# Patient Record
Sex: FEMALE | Race: OTHER
[De-identification: names, ages, dates, MRNs, and addresses within clinical notes are randomized per-mention and may not be internally consistent; named-entity substitution may affect disease eponyms.]

---

## 2017-06-19 ENCOUNTER — HOSPITAL ENCOUNTER (INPATIENT)
Dept: HOSPITAL 54 - ER | Age: 82
LOS: 2 days | Discharge: HOME | DRG: 201 | End: 2017-06-21
Attending: NURSE PRACTITIONER | Admitting: NURSE PRACTITIONER
Payer: MEDICAID

## 2017-06-19 VITALS — DIASTOLIC BLOOD PRESSURE: 66 MMHG | SYSTOLIC BLOOD PRESSURE: 129 MMHG

## 2017-06-19 VITALS — SYSTOLIC BLOOD PRESSURE: 145 MMHG | DIASTOLIC BLOOD PRESSURE: 65 MMHG

## 2017-06-19 VITALS — DIASTOLIC BLOOD PRESSURE: 53 MMHG | SYSTOLIC BLOOD PRESSURE: 105 MMHG

## 2017-06-19 VITALS — HEIGHT: 64 IN | WEIGHT: 159 LBS | BODY MASS INDEX: 27.14 KG/M2

## 2017-06-19 DIAGNOSIS — E11.9: ICD-10-CM

## 2017-06-19 DIAGNOSIS — K21.9: ICD-10-CM

## 2017-06-19 DIAGNOSIS — E83.42: ICD-10-CM

## 2017-06-19 DIAGNOSIS — Z90.49: ICD-10-CM

## 2017-06-19 DIAGNOSIS — E78.5: ICD-10-CM

## 2017-06-19 DIAGNOSIS — N17.0: ICD-10-CM

## 2017-06-19 DIAGNOSIS — E03.9: ICD-10-CM

## 2017-06-19 DIAGNOSIS — Z96.659: ICD-10-CM

## 2017-06-19 DIAGNOSIS — Z79.01: ICD-10-CM

## 2017-06-19 DIAGNOSIS — I21.4: ICD-10-CM

## 2017-06-19 DIAGNOSIS — D68.59: ICD-10-CM

## 2017-06-19 DIAGNOSIS — R07.9: ICD-10-CM

## 2017-06-19 DIAGNOSIS — I10: ICD-10-CM

## 2017-06-19 DIAGNOSIS — I48.0: Primary | ICD-10-CM

## 2017-06-19 LAB
ALBUMIN SERPL BCP-MCNC: 3.3 G/DL (ref 3.4–5)
ALP SERPL-CCNC: 96 U/L (ref 46–116)
ALT SERPL W P-5'-P-CCNC: 18 U/L (ref 12–78)
APTT PPP: 28 SEC (ref 23–34)
AST SERPL W P-5'-P-CCNC: 22 U/L (ref 15–37)
BASOPHILS # BLD AUTO: 0 /CMM (ref 0–0.2)
BASOPHILS NFR BLD AUTO: 0.7 % (ref 0–2)
BILIRUB DIRECT SERPL-MCNC: 0.1 MG/DL (ref 0–0.2)
BILIRUB SERPL-MCNC: 0.4 MG/DL (ref 0.2–1)
BUN SERPL-MCNC: 34 MG/DL (ref 7–18)
CALCIUM SERPL-MCNC: 9.1 MG/DL (ref 8.5–10.1)
CHLORIDE SERPL-SCNC: 103 MMOL/L (ref 98–107)
CO2 SERPL-SCNC: 26 MMOL/L (ref 21–32)
CREAT SERPL-MCNC: 1.5 MG/DL (ref 0.6–1.3)
EOSINOPHIL # BLD AUTO: 0.1 /CMM (ref 0–0.7)
EOSINOPHIL NFR BLD AUTO: 1.7 % (ref 0–6)
GLUCOSE SERPL-MCNC: 102 MG/DL (ref 74–106)
HCT VFR BLD AUTO: 33 % (ref 33–45)
HGB BLD-MCNC: 11.2 G/DL (ref 11.5–14.8)
INR PPP: 1 (ref 0.87–1.13)
LYMPHOCYTES NFR BLD AUTO: 2.2 /CMM (ref 0.8–4.8)
LYMPHOCYTES NFR BLD AUTO: 31.4 % (ref 20–44)
MCH RBC QN AUTO: 28 PG (ref 26–33)
MCHC RBC AUTO-ENTMCNC: 34 G/DL (ref 31–36)
MCV RBC AUTO: 83 FL (ref 82–100)
MONOCYTES NFR BLD AUTO: 0.5 /CMM (ref 0.1–1.3)
MONOCYTES NFR BLD AUTO: 6.8 % (ref 2–12)
NEUTROPHILS # BLD AUTO: 4.1 /CMM (ref 1.8–8.9)
NEUTROPHILS NFR BLD AUTO: 59.4 % (ref 43–81)
PLATELET # BLD AUTO: 266 /CMM (ref 150–450)
POTASSIUM SERPL-SCNC: 3.9 MMOL/L (ref 3.5–5.1)
PROT SERPL-MCNC: 7 G/DL (ref 6.4–8.2)
PROTHROMBIN TIME: 10.7 SECS (ref 9.5–12.7)
RBC # BLD AUTO: 3.98 MIL/UL (ref 4–5.2)
RDW COEFFICIENT OF VARIATION: 15.2 (ref 11.5–15)
SODIUM SERPL-SCNC: 141 MMOL/L (ref 136–145)
TROPONIN I SERPL-MCNC: 0.02 NG/ML (ref 0–0.06)
WBC NRBC COR # BLD AUTO: 6.9 K/UL (ref 4.3–11)

## 2017-06-19 PROCEDURE — Z7610: HCPCS

## 2017-06-19 PROCEDURE — A6407 PACKING STRIPS, NON-IMPREG: HCPCS

## 2017-06-19 PROCEDURE — A4606 OXYGEN PROBE USED W OXIMETER: HCPCS

## 2017-06-19 RX ADMIN — METOPROLOL TARTRATE SCH MG: 25 TABLET, FILM COATED ORAL at 17:29

## 2017-06-19 RX ADMIN — FUROSEMIDE SCH MG: 40 TABLET ORAL at 17:28

## 2017-06-19 RX ADMIN — AMIODARONE HYDROCHLORIDE SCH MG: 200 TABLET ORAL at 17:29

## 2017-06-19 RX ADMIN — SIMVASTATIN SCH MG: 10 TABLET, FILM COATED ORAL at 22:15

## 2017-06-19 RX ADMIN — SODIUM CHLORIDE PRN MLS/HR: 9 INJECTION, SOLUTION INTRAVENOUS at 17:28

## 2017-06-19 RX ADMIN — RIVAROXABAN SCH MG: 15 TABLET, FILM COATED ORAL at 17:30

## 2017-06-20 VITALS — DIASTOLIC BLOOD PRESSURE: 63 MMHG | SYSTOLIC BLOOD PRESSURE: 149 MMHG

## 2017-06-20 VITALS — DIASTOLIC BLOOD PRESSURE: 68 MMHG | SYSTOLIC BLOOD PRESSURE: 140 MMHG

## 2017-06-20 VITALS — SYSTOLIC BLOOD PRESSURE: 156 MMHG | DIASTOLIC BLOOD PRESSURE: 65 MMHG

## 2017-06-20 VITALS — DIASTOLIC BLOOD PRESSURE: 63 MMHG | SYSTOLIC BLOOD PRESSURE: 154 MMHG

## 2017-06-20 VITALS — SYSTOLIC BLOOD PRESSURE: 149 MMHG | DIASTOLIC BLOOD PRESSURE: 63 MMHG

## 2017-06-20 VITALS — SYSTOLIC BLOOD PRESSURE: 148 MMHG | DIASTOLIC BLOOD PRESSURE: 61 MMHG

## 2017-06-20 VITALS — DIASTOLIC BLOOD PRESSURE: 62 MMHG | SYSTOLIC BLOOD PRESSURE: 120 MMHG

## 2017-06-20 LAB
ALBUMIN SERPL BCP-MCNC: 2.9 G/DL (ref 3.4–5)
ALP SERPL-CCNC: 81 U/L (ref 46–116)
ALT SERPL W P-5'-P-CCNC: 17 U/L (ref 12–78)
AST SERPL W P-5'-P-CCNC: 22 U/L (ref 15–37)
BASOPHILS # BLD AUTO: 0 /CMM (ref 0–0.2)
BASOPHILS NFR BLD AUTO: 0.4 % (ref 0–2)
BILIRUB SERPL-MCNC: 0.4 MG/DL (ref 0.2–1)
BUN SERPL-MCNC: 27 MG/DL (ref 7–18)
CALCIUM SERPL-MCNC: 8.7 MG/DL (ref 8.5–10.1)
CHLORIDE SERPL-SCNC: 107 MMOL/L (ref 98–107)
CHOLEST SERPL-MCNC: 128 MG/DL (ref ?–200)
CO2 SERPL-SCNC: 31 MMOL/L (ref 21–32)
CREAT SERPL-MCNC: 1.3 MG/DL (ref 0.6–1.3)
EOSINOPHIL # BLD AUTO: 0.2 /CMM (ref 0–0.7)
EOSINOPHIL NFR BLD AUTO: 2.7 % (ref 0–6)
FERRITIN SERPL-MCNC: 31 NG/ML (ref 8–388)
GLUCOSE SERPL-MCNC: 84 MG/DL (ref 74–106)
HCT VFR BLD AUTO: 33 % (ref 33–45)
HDLC SERPL-MCNC: 33 MG/DL (ref 40–60)
HGB BLD-MCNC: 10.9 G/DL (ref 11.5–14.8)
IRON SERPL-MCNC: 68 UG/DL (ref 50–175)
LDLC SERPL DIRECT ASSAY-MCNC: 65 MG/DL (ref 0–99)
LYMPHOCYTES NFR BLD AUTO: 1.9 /CMM (ref 0.8–4.8)
LYMPHOCYTES NFR BLD AUTO: 29.1 % (ref 20–44)
MAGNESIUM SERPL-MCNC: 1.7 MG/DL (ref 1.8–2.4)
MCH RBC QN AUTO: 28 PG (ref 26–33)
MCHC RBC AUTO-ENTMCNC: 33 G/DL (ref 31–36)
MCV RBC AUTO: 85 FL (ref 82–100)
MONOCYTES NFR BLD AUTO: 0.4 /CMM (ref 0.1–1.3)
MONOCYTES NFR BLD AUTO: 6.5 % (ref 2–12)
NEUTROPHILS # BLD AUTO: 4.1 /CMM (ref 1.8–8.9)
NEUTROPHILS NFR BLD AUTO: 61.3 % (ref 43–81)
PHOSPHATE SERPL-MCNC: 4 MG/DL (ref 2.5–4.9)
PLATELET # BLD AUTO: 244 /CMM (ref 150–450)
POTASSIUM SERPL-SCNC: 4.3 MMOL/L (ref 3.5–5.1)
PROT SERPL-MCNC: 6.5 G/DL (ref 6.4–8.2)
RBC # BLD AUTO: 3.89 MIL/UL (ref 4–5.2)
RDW COEFFICIENT OF VARIATION: 16.4 (ref 11.5–15)
SODIUM SERPL-SCNC: 142 MMOL/L (ref 136–145)
TIBC SERPL-MCNC: 282 UG/DL (ref 250–450)
TRIGL SERPL-MCNC: 138 MG/DL (ref 30–150)
TSH SERPL DL<=0.005 MIU/L-ACNC: 6.12 UIU/ML (ref 0.36–3.74)
WBC NRBC COR # BLD AUTO: 6.7 K/UL (ref 4.3–11)

## 2017-06-20 RX ADMIN — FUROSEMIDE SCH MG: 40 TABLET ORAL at 18:23

## 2017-06-20 RX ADMIN — FUROSEMIDE SCH MG: 40 TABLET ORAL at 08:13

## 2017-06-20 RX ADMIN — SODIUM CHLORIDE PRN MLS/HR: 9 INJECTION, SOLUTION INTRAVENOUS at 08:15

## 2017-06-20 RX ADMIN — LISINOPRIL SCH MG: 20 TABLET ORAL at 08:13

## 2017-06-20 RX ADMIN — SIMVASTATIN SCH MG: 10 TABLET, FILM COATED ORAL at 21:16

## 2017-06-20 RX ADMIN — METOPROLOL TARTRATE SCH MG: 25 TABLET, FILM COATED ORAL at 08:14

## 2017-06-20 RX ADMIN — AMIODARONE HYDROCHLORIDE SCH MG: 200 TABLET ORAL at 17:00

## 2017-06-20 RX ADMIN — MAGNESIUM SULFATE IN DEXTROSE SCH MLS/HR: 10 INJECTION, SOLUTION INTRAVENOUS at 12:52

## 2017-06-20 RX ADMIN — MAGNESIUM SULFATE IN DEXTROSE SCH MLS/HR: 10 INJECTION, SOLUTION INTRAVENOUS at 14:08

## 2017-06-20 RX ADMIN — RIVAROXABAN SCH MG: 15 TABLET, FILM COATED ORAL at 18:27

## 2017-06-20 RX ADMIN — AMIODARONE HYDROCHLORIDE SCH MG: 200 TABLET ORAL at 12:53

## 2017-06-20 RX ADMIN — METOPROLOL TARTRATE SCH MG: 25 TABLET, FILM COATED ORAL at 17:00

## 2017-06-20 RX ADMIN — AMIODARONE HYDROCHLORIDE SCH MG: 200 TABLET ORAL at 08:14

## 2017-06-21 VITALS — DIASTOLIC BLOOD PRESSURE: 72 MMHG | SYSTOLIC BLOOD PRESSURE: 137 MMHG

## 2017-06-21 VITALS — SYSTOLIC BLOOD PRESSURE: 140 MMHG | DIASTOLIC BLOOD PRESSURE: 72 MMHG

## 2017-06-21 VITALS — DIASTOLIC BLOOD PRESSURE: 66 MMHG | SYSTOLIC BLOOD PRESSURE: 137 MMHG

## 2017-06-21 VITALS — DIASTOLIC BLOOD PRESSURE: 70 MMHG | SYSTOLIC BLOOD PRESSURE: 155 MMHG

## 2017-06-21 VITALS — DIASTOLIC BLOOD PRESSURE: 72 MMHG | SYSTOLIC BLOOD PRESSURE: 140 MMHG

## 2017-06-21 LAB
ALBUMIN SERPL BCP-MCNC: 2.8 G/DL (ref 3.4–5)
ALP SERPL-CCNC: 87 U/L (ref 46–116)
ALT SERPL W P-5'-P-CCNC: 19 U/L (ref 12–78)
AST SERPL W P-5'-P-CCNC: 27 U/L (ref 15–37)
BASOPHILS # BLD AUTO: 0 /CMM (ref 0–0.2)
BASOPHILS NFR BLD AUTO: 0.4 % (ref 0–2)
BILIRUB SERPL-MCNC: 0.5 MG/DL (ref 0.2–1)
BUN SERPL-MCNC: 24 MG/DL (ref 7–18)
CALCIUM SERPL-MCNC: 8.5 MG/DL (ref 8.5–10.1)
CHLORIDE SERPL-SCNC: 106 MMOL/L (ref 98–107)
CO2 SERPL-SCNC: 29 MMOL/L (ref 21–32)
CREAT SERPL-MCNC: 1.3 MG/DL (ref 0.6–1.3)
EOSINOPHIL # BLD AUTO: 0.2 /CMM (ref 0–0.7)
EOSINOPHIL NFR BLD AUTO: 3.1 % (ref 0–6)
GLUCOSE SERPL-MCNC: 93 MG/DL (ref 74–106)
HCT VFR BLD AUTO: 33 % (ref 33–45)
HGB BLD-MCNC: 11 G/DL (ref 11.5–14.8)
LYMPHOCYTES NFR BLD AUTO: 1.7 /CMM (ref 0.8–4.8)
LYMPHOCYTES NFR BLD AUTO: 27.6 % (ref 20–44)
MAGNESIUM SERPL-MCNC: 1.8 MG/DL (ref 1.8–2.4)
MCH RBC QN AUTO: 28 PG (ref 26–33)
MCHC RBC AUTO-ENTMCNC: 33 G/DL (ref 31–36)
MCV RBC AUTO: 85 FL (ref 82–100)
MONOCYTES NFR BLD AUTO: 0.4 /CMM (ref 0.1–1.3)
MONOCYTES NFR BLD AUTO: 6.6 % (ref 2–12)
NEUTROPHILS # BLD AUTO: 3.8 /CMM (ref 1.8–8.9)
NEUTROPHILS NFR BLD AUTO: 62.3 % (ref 43–81)
PHOSPHATE SERPL-MCNC: 3.7 MG/DL (ref 2.5–4.9)
PLATELET # BLD AUTO: 237 /CMM (ref 150–450)
POTASSIUM SERPL-SCNC: 4 MMOL/L (ref 3.5–5.1)
PROT SERPL-MCNC: 6.5 G/DL (ref 6.4–8.2)
RBC # BLD AUTO: 3.91 MIL/UL (ref 4–5.2)
RDW COEFFICIENT OF VARIATION: 16 (ref 11.5–15)
SODIUM SERPL-SCNC: 142 MMOL/L (ref 136–145)
TROPONIN I SERPL-MCNC: 0.03 NG/ML (ref 0–0.06)
WBC NRBC COR # BLD AUTO: 6.2 K/UL (ref 4.3–11)

## 2017-06-21 RX ADMIN — AMIODARONE HYDROCHLORIDE SCH MG: 200 TABLET ORAL at 12:24

## 2017-06-21 RX ADMIN — LISINOPRIL SCH MG: 20 TABLET ORAL at 08:48

## 2017-06-21 RX ADMIN — SODIUM CHLORIDE PRN MLS/HR: 9 INJECTION, SOLUTION INTRAVENOUS at 01:45

## 2017-06-21 RX ADMIN — FUROSEMIDE SCH MG: 40 TABLET ORAL at 08:46

## 2017-06-21 RX ADMIN — AMIODARONE HYDROCHLORIDE SCH MG: 200 TABLET ORAL at 08:49

## 2017-06-21 RX ADMIN — METOPROLOL TARTRATE SCH MG: 25 TABLET, FILM COATED ORAL at 08:48

## 2018-06-22 ENCOUNTER — HOSPITAL ENCOUNTER (EMERGENCY)
Dept: HOSPITAL 54 - ER | Age: 83
LOS: 1 days | Discharge: HOME | End: 2018-06-23
Payer: COMMERCIAL

## 2018-06-22 VITALS — HEIGHT: 65 IN | WEIGHT: 145 LBS | BODY MASS INDEX: 24.16 KG/M2

## 2018-06-22 DIAGNOSIS — E78.00: ICD-10-CM

## 2018-06-22 DIAGNOSIS — E05.90: ICD-10-CM

## 2018-06-22 DIAGNOSIS — I11.0: ICD-10-CM

## 2018-06-22 DIAGNOSIS — Z98.890: ICD-10-CM

## 2018-06-22 DIAGNOSIS — I50.9: ICD-10-CM

## 2018-06-22 DIAGNOSIS — M25.562: Primary | ICD-10-CM

## 2018-06-22 DIAGNOSIS — M17.9: ICD-10-CM

## 2018-06-22 DIAGNOSIS — I48.91: ICD-10-CM

## 2018-06-22 PROCEDURE — A4606 OXYGEN PROBE USED W OXIMETER: HCPCS

## 2018-06-22 PROCEDURE — Z7610: HCPCS

## 2018-06-23 VITALS — DIASTOLIC BLOOD PRESSURE: 84 MMHG | SYSTOLIC BLOOD PRESSURE: 159 MMHG

## 2021-02-21 ENCOUNTER — HOSPITAL ENCOUNTER (INPATIENT)
Dept: HOSPITAL 54 - ER | Age: 86
LOS: 3 days | Discharge: HOME | DRG: 253 | End: 2021-02-24
Attending: NURSE PRACTITIONER | Admitting: INTERNAL MEDICINE
Payer: MEDICAID

## 2021-02-21 VITALS — WEIGHT: 144 LBS | HEIGHT: 65 IN | BODY MASS INDEX: 23.99 KG/M2

## 2021-02-21 VITALS — SYSTOLIC BLOOD PRESSURE: 131 MMHG | DIASTOLIC BLOOD PRESSURE: 73 MMHG

## 2021-02-21 DIAGNOSIS — E86.1: ICD-10-CM

## 2021-02-21 DIAGNOSIS — M41.9: ICD-10-CM

## 2021-02-21 DIAGNOSIS — Z79.899: ICD-10-CM

## 2021-02-21 DIAGNOSIS — Z20.822: ICD-10-CM

## 2021-02-21 DIAGNOSIS — I48.91: ICD-10-CM

## 2021-02-21 DIAGNOSIS — K57.90: ICD-10-CM

## 2021-02-21 DIAGNOSIS — J98.11: ICD-10-CM

## 2021-02-21 DIAGNOSIS — N17.0: ICD-10-CM

## 2021-02-21 DIAGNOSIS — E03.9: ICD-10-CM

## 2021-02-21 DIAGNOSIS — I10: ICD-10-CM

## 2021-02-21 DIAGNOSIS — N13.9: ICD-10-CM

## 2021-02-21 DIAGNOSIS — Z79.01: ICD-10-CM

## 2021-02-21 DIAGNOSIS — I70.0: ICD-10-CM

## 2021-02-21 DIAGNOSIS — Z90.710: ICD-10-CM

## 2021-02-21 DIAGNOSIS — K92.2: Primary | ICD-10-CM

## 2021-02-21 DIAGNOSIS — E78.5: ICD-10-CM

## 2021-02-21 LAB
ALBUMIN SERPL BCP-MCNC: 3.4 G/DL (ref 3.4–5)
ALP SERPL-CCNC: 135 U/L (ref 46–116)
ALT SERPL W P-5'-P-CCNC: 24 U/L (ref 12–78)
AST SERPL W P-5'-P-CCNC: 41 U/L (ref 15–37)
BASOPHILS # BLD AUTO: 0.1 /CMM (ref 0–0.2)
BASOPHILS NFR BLD AUTO: 1.2 % (ref 0–2)
BILIRUB DIRECT SERPL-MCNC: 0.1 MG/DL (ref 0–0.2)
BILIRUB SERPL-MCNC: 0.3 MG/DL (ref 0.2–1)
BUN SERPL-MCNC: 21 MG/DL (ref 7–18)
CALCIUM SERPL-MCNC: 8.9 MG/DL (ref 8.5–10.1)
CHLORIDE SERPL-SCNC: 103 MMOL/L (ref 98–107)
CO2 SERPL-SCNC: 24 MMOL/L (ref 21–32)
CREAT SERPL-MCNC: 1.3 MG/DL (ref 0.6–1.3)
EOSINOPHIL NFR BLD AUTO: 1.3 % (ref 0–6)
GLUCOSE SERPL-MCNC: 165 MG/DL (ref 74–106)
HCT VFR BLD AUTO: 43 % (ref 33–45)
HGB BLD-MCNC: 14.3 G/DL (ref 11.5–14.8)
LIPASE SERPL-CCNC: 189 U/L (ref 73–393)
LYMPHOCYTES NFR BLD AUTO: 1.4 /CMM (ref 0.8–4.8)
LYMPHOCYTES NFR BLD AUTO: 22.9 % (ref 20–44)
MCHC RBC AUTO-ENTMCNC: 33 G/DL (ref 31–36)
MCV RBC AUTO: 90 FL (ref 82–100)
MONOCYTES NFR BLD AUTO: 0.3 /CMM (ref 0.1–1.3)
MONOCYTES NFR BLD AUTO: 5.4 % (ref 2–12)
NEUTROPHILS # BLD AUTO: 4.2 /CMM (ref 1.8–8.9)
NEUTROPHILS NFR BLD AUTO: 69.2 % (ref 43–81)
PLATELET # BLD AUTO: 293 /CMM (ref 150–450)
POTASSIUM SERPL-SCNC: 4.4 MMOL/L (ref 3.5–5.1)
PROT SERPL-MCNC: 7.5 G/DL (ref 6.4–8.2)
RBC # BLD AUTO: 4.79 MIL/UL (ref 4–5.2)
SODIUM SERPL-SCNC: 140 MMOL/L (ref 136–145)
WBC NRBC COR # BLD AUTO: 6.1 K/UL (ref 4.3–11)

## 2021-02-21 PROCEDURE — C9803 HOPD COVID-19 SPEC COLLECT: HCPCS

## 2021-02-21 PROCEDURE — G0378 HOSPITAL OBSERVATION PER HR: HCPCS

## 2021-02-21 PROCEDURE — C9113 INJ PANTOPRAZOLE SODIUM, VIA: HCPCS

## 2021-02-21 NOTE — NUR
TELE RN NOTE



Patient arrived to unit @ 2225 via [facility]. VSS. Patient is a/o x3-4, St Helenian speaking only. 
Tele montior uncontrolled a-fib, HR , MD aware. Breath sounds even, clear, unlabored 
on room air. No acute distress or SOB noted. Peripheral pulses 2+, symmetrical. Skin is 
warm, pink, dry. Small skin tear noted on sacrum. Photo taken and documented.  strength 
5+, equal. BS active. Abdomen flat, soft, non-tender. Patient void via bedpan. Urine output 
clear and yellow. IV site LAC 18g saline locked, patent and intact. Patient oriented to 
room. Patient verbalizes understanding how to use call light. Bed in low position, wheels 
locked, side rails up x2.

## 2021-02-21 NOTE — NUR
FROM HOME C/O RECTAL BLEEDING AND WEAKNESS. PATIENT A/OX4, Upper sorbian SPEAKING,  
BREATHING EVEN AND UNLABORED, NOS OB NOTED. NEEDS ATTENDED. CHANGED INTO A 
GOWN. ATTACHED TO THE CARDIAC MONITOR.

## 2021-02-22 VITALS — SYSTOLIC BLOOD PRESSURE: 138 MMHG | DIASTOLIC BLOOD PRESSURE: 75 MMHG

## 2021-02-22 VITALS — DIASTOLIC BLOOD PRESSURE: 86 MMHG | SYSTOLIC BLOOD PRESSURE: 154 MMHG

## 2021-02-22 VITALS — SYSTOLIC BLOOD PRESSURE: 168 MMHG | DIASTOLIC BLOOD PRESSURE: 79 MMHG

## 2021-02-22 VITALS — SYSTOLIC BLOOD PRESSURE: 177 MMHG | DIASTOLIC BLOOD PRESSURE: 86 MMHG

## 2021-02-22 VITALS — DIASTOLIC BLOOD PRESSURE: 87 MMHG | SYSTOLIC BLOOD PRESSURE: 155 MMHG

## 2021-02-22 VITALS — SYSTOLIC BLOOD PRESSURE: 141 MMHG | DIASTOLIC BLOOD PRESSURE: 68 MMHG

## 2021-02-22 VITALS — SYSTOLIC BLOOD PRESSURE: 146 MMHG | DIASTOLIC BLOOD PRESSURE: 68 MMHG

## 2021-02-22 LAB
BASOPHILS # BLD AUTO: 0.1 /CMM (ref 0–0.2)
BASOPHILS NFR BLD AUTO: 0.9 % (ref 0–2)
BUN SERPL-MCNC: 19 MG/DL (ref 7–18)
CALCIUM SERPL-MCNC: 8.1 MG/DL (ref 8.5–10.1)
CHLORIDE SERPL-SCNC: 107 MMOL/L (ref 98–107)
CO2 SERPL-SCNC: 28 MMOL/L (ref 21–32)
CREAT SERPL-MCNC: 1 MG/DL (ref 0.6–1.3)
EOSINOPHIL NFR BLD AUTO: 2 % (ref 0–6)
GLUCOSE SERPL-MCNC: 81 MG/DL (ref 74–106)
HCT VFR BLD AUTO: 38 % (ref 33–45)
HEMOCCULT STL QL: NEGATIVE
HGB BLD-MCNC: 12.4 G/DL (ref 11.5–14.8)
LYMPHOCYTES NFR BLD AUTO: 2 /CMM (ref 0.8–4.8)
LYMPHOCYTES NFR BLD AUTO: 28.9 % (ref 20–44)
MAGNESIUM SERPL-MCNC: 1.9 MG/DL (ref 1.8–2.4)
MCHC RBC AUTO-ENTMCNC: 33 G/DL (ref 31–36)
MCV RBC AUTO: 89 FL (ref 82–100)
MONOCYTES NFR BLD AUTO: 0.5 /CMM (ref 0.1–1.3)
MONOCYTES NFR BLD AUTO: 6.8 % (ref 2–12)
NEUTROPHILS # BLD AUTO: 4.2 /CMM (ref 1.8–8.9)
NEUTROPHILS NFR BLD AUTO: 61.4 % (ref 43–81)
PHOSPHATE SERPL-MCNC: 3.6 MG/DL (ref 2.5–4.9)
PLATELET # BLD AUTO: 252 /CMM (ref 150–450)
POTASSIUM SERPL-SCNC: 3.9 MMOL/L (ref 3.5–5.1)
RBC # BLD AUTO: 4.26 MIL/UL (ref 4–5.2)
SODIUM SERPL-SCNC: 141 MMOL/L (ref 136–145)
WBC NRBC COR # BLD AUTO: 6.8 K/UL (ref 4.3–11)

## 2021-02-22 RX ADMIN — SODIUM CHLORIDE PRN MLS/HR: 4.5 INJECTION, SOLUTION INTRAVENOUS at 15:24

## 2021-02-22 RX ADMIN — HYDRALAZINE HYDROCHLORIDE PRN MG: 20 INJECTION INTRAMUSCULAR; INTRAVENOUS at 16:31

## 2021-02-22 RX ADMIN — SODIUM CHLORIDE SCH MG: 9 INJECTION, SOLUTION INTRAVENOUS at 22:08

## 2021-02-22 RX ADMIN — THYROID, PORCINE SCH MG: 30 TABLET ORAL at 08:26

## 2021-02-22 RX ADMIN — SODIUM CHLORIDE PRN MLS/HR: 4.5 INJECTION, SOLUTION INTRAVENOUS at 03:05

## 2021-02-22 RX ADMIN — SODIUM CHLORIDE SCH MG: 9 INJECTION, SOLUTION INTRAVENOUS at 08:25

## 2021-02-22 NOTE — NUR
TELE RN CLOSING NOTE



Patient is a/o x3-4, Occitan speaking only. Tele monitor uncontrolled a-fib, HR . 
Breath sounds even, clear, unlabored on room air. No acute distress or SOB noted. Abdomen 
flat, soft, non-tender. Patient void via bedpan x2.  Urine output clear and yellow. IV site 
LAC 18g saline locked, patent and intact. All needs met. All scheduled medications 
administered as ordered. Bed in low position, wheels locked, side rails up x2.

## 2021-02-22 NOTE — NUR
TELE RN OPENING NOTE



RECEIVED PT IN BED, SLEEPING BUT AROUSABLE AND RESPONSIVE. PT IS A/O X 4, Marshallese SPEAKING 
BUT CAN UNDERSTAND SOME ENGLISH, ABLE TO MAKE NEEDS KNOWN WITH NO C/O PAIN AT THIS TIME. PT 
IS ON ROOM AIR WITH NO SOB, NO LABORED BREATHING AND NO S/SX OF RESPIRATORY DISTRESS. TELE 
MONITOR SHOWS NSR AT 67 WITH PAC. IV ACCESS ON LEFT AC G#18 IS PATENT, INTACT AND FLUSHING 
WELL, RUNNING 1/2 NS AT 75 ML/HR, WITH NO REDNESS, IRRITATION, INFLAMMATION OR INFILTRATION 
NOTED AT SITE. SAFETY MEASURES IN PLACE: BED IS IN LOWEST, LOCKED POSITION, UPPER SR X 2 UP, 
AND CALL LIGHT PLACED WITHIN REACH WITH RETURN DEMONSTRATION THAT SHOWS UNDERSTANDING. WILL 
CONTINUE TO MONITOR.

## 2021-02-22 NOTE — NUR
TELE/RN OPENING NOTES 



RECEIVED PATIENT IN BED RESTING. PATIENT IS ALERT AND ORIENTED X 3. PATIENTS BREATHING IS 
EVEN AND UNLABORED. NO SIGNS OF SOB OR RESPIRATORY DISTRESS NOTED. PATIENT STATES NO PAIN AT 
THIS TIME. PATIENT IN NO SIGNS OF ACUTE DISTRESS. IV ACCESS INTACT FLUSHING WELL. SAFETY 
MEASURES ARE IN PLACE, BED IS LOCKED AND PLACED IN THE LOW POSITION, SIDE RAILS UP X 2, CALL 
LIGHT IS WITHIN REACH. WILL CONTINUE TO MONITOR THROUGH OUT SHIFT.

## 2021-02-22 NOTE — NUR
TELE RN CLOSING NOTE



PT IS IN AWAKE AND A/O X4. PT SPEAKS PRIMARILY Lithuanian BUT CAN UNDERSTAND SOME ENGLISH. NO 
C/O PAIN AT THIS TIME. PT IS ON ROOM AIR, NO SOB, LABORED BREATHING OR RESPIRATORY DISTRESS 
NOTED AT THIS TIME. PT'S TELE MONITOR SHOWS A FIB IN 40'S-60'S, NO CARDIAC DISTRESS NOTED. 
IV ON LEFT FOREARM G#22 IS PATENT, INTACT, FLUSHING WELL AND RUNNING 1/2 NS @ 75 ML/HR, PT 
TOLERATING WELL WITH NO S/SX OF INFILTRATION/INFECTION. SAFETY MEASURES MAINTAINED: BED IN 
LOWEST POSITION AND LOCKED WITH BOTH UPPER SIDE RAILS X2 UP. CALL LIGHT PLACED WITHIN REACH. 
WILL ENDORSE TO NIGHT SHIFT NURSE.

-------------------------------------------------------------------------------

Addendum: 02/22/21 at 1849 by TERESA CANDELARIO RN

-------------------------------------------------------------------------------

PT'S TELE MONITOR SHOWS NSR 60'S-82 BPM, NOT 40'S-60'S.

## 2021-02-22 NOTE — NUR
RN NOTE



IV SITE ON LEFT AC G#18 NOTED LEAKING. NEW IV ACCESS INSERTED ON LEFT FOREARM G#22, TAPED 
AND DATED. RESUMED CONTINUOUS IVF AS ORDERED. WILL CONTINUE TO MONITOR.

## 2021-02-22 NOTE — NUR
WOUND CARE CONSULT: PT PRESENTS WITH LEFT BUTTOCK HEALING ABRASION, PRESENT ON ADMISSION. 
RECOMMENDATIONS MADE FOR SKIN PROTECTION AND WOUND CARE. DISCUSSED WITH NURSING STAFF. PT IS 
INDEPENDENT WITH BED MOBILITY AND IS CONTINENT AT THIS TIME. 

-------------------------------------------------------------------------------

Addendum: 02/22/21 at 1014 by BRAEDEN TABOR WNDNU

-------------------------------------------------------------------------------

Amended: Links added.

## 2021-02-22 NOTE — NUR
RN NOTES



 PATIENT NOTED WITH ELEVATED BP /79 mmHg. DR STEVENSON MADE AWARE WITH ORDER TO 
ADMINISTER HYDRALAZINE 10MG IV AND WAS GIVEN. WILL CONTINUE TO MONITOR.

## 2021-02-23 VITALS — DIASTOLIC BLOOD PRESSURE: 75 MMHG | SYSTOLIC BLOOD PRESSURE: 136 MMHG

## 2021-02-23 VITALS — DIASTOLIC BLOOD PRESSURE: 76 MMHG | SYSTOLIC BLOOD PRESSURE: 165 MMHG

## 2021-02-23 VITALS — DIASTOLIC BLOOD PRESSURE: 74 MMHG | SYSTOLIC BLOOD PRESSURE: 184 MMHG

## 2021-02-23 VITALS — DIASTOLIC BLOOD PRESSURE: 63 MMHG | SYSTOLIC BLOOD PRESSURE: 144 MMHG

## 2021-02-23 VITALS — DIASTOLIC BLOOD PRESSURE: 77 MMHG | SYSTOLIC BLOOD PRESSURE: 158 MMHG

## 2021-02-23 LAB
BASOPHILS # BLD AUTO: 0.1 /CMM (ref 0–0.2)
BASOPHILS NFR BLD AUTO: 1 % (ref 0–2)
BUN SERPL-MCNC: 15 MG/DL (ref 7–18)
CALCIUM SERPL-MCNC: 9.1 MG/DL (ref 8.5–10.1)
CHLORIDE SERPL-SCNC: 104 MMOL/L (ref 98–107)
CO2 SERPL-SCNC: 24 MMOL/L (ref 21–32)
CREAT SERPL-MCNC: 0.9 MG/DL (ref 0.6–1.3)
EOSINOPHIL NFR BLD AUTO: 0.6 % (ref 0–6)
GLUCOSE SERPL-MCNC: 91 MG/DL (ref 74–106)
HCT VFR BLD AUTO: 39 % (ref 33–45)
HGB BLD-MCNC: 12.7 G/DL (ref 11.5–14.8)
LYMPHOCYTES NFR BLD AUTO: 1.2 /CMM (ref 0.8–4.8)
LYMPHOCYTES NFR BLD AUTO: 17.3 % (ref 20–44)
MAGNESIUM SERPL-MCNC: 1.7 MG/DL (ref 1.8–2.4)
MCHC RBC AUTO-ENTMCNC: 33 G/DL (ref 31–36)
MCV RBC AUTO: 89 FL (ref 82–100)
MONOCYTES NFR BLD AUTO: 0.3 /CMM (ref 0.1–1.3)
MONOCYTES NFR BLD AUTO: 3.7 % (ref 2–12)
NEUTROPHILS # BLD AUTO: 5.3 /CMM (ref 1.8–8.9)
NEUTROPHILS NFR BLD AUTO: 77.4 % (ref 43–81)
PHOSPHATE SERPL-MCNC: 3.7 MG/DL (ref 2.5–4.9)
PLATELET # BLD AUTO: 266 /CMM (ref 150–450)
POTASSIUM SERPL-SCNC: 3.8 MMOL/L (ref 3.5–5.1)
RBC # BLD AUTO: 4.32 MIL/UL (ref 4–5.2)
SODIUM SERPL-SCNC: 139 MMOL/L (ref 136–145)
WBC NRBC COR # BLD AUTO: 6.8 K/UL (ref 4.3–11)

## 2021-02-23 RX ADMIN — THYROID, PORCINE SCH MG: 30 TABLET ORAL at 09:00

## 2021-02-23 RX ADMIN — MAGNESIUM SULFATE IN DEXTROSE SCH MLS/HR: 10 INJECTION, SOLUTION INTRAVENOUS at 14:58

## 2021-02-23 RX ADMIN — HYDRALAZINE HYDROCHLORIDE PRN MG: 20 INJECTION INTRAMUSCULAR; INTRAVENOUS at 00:26

## 2021-02-23 RX ADMIN — SODIUM CHLORIDE PRN MLS/HR: 4.5 INJECTION, SOLUTION INTRAVENOUS at 17:37

## 2021-02-23 RX ADMIN — MAGNESIUM SULFATE IN DEXTROSE SCH MLS/HR: 10 INJECTION, SOLUTION INTRAVENOUS at 13:10

## 2021-02-23 RX ADMIN — SODIUM CHLORIDE SCH MG: 9 INJECTION, SOLUTION INTRAVENOUS at 21:15

## 2021-02-23 RX ADMIN — SODIUM CHLORIDE SCH MG: 9 INJECTION, SOLUTION INTRAVENOUS at 10:48

## 2021-02-23 NOTE — NUR
TELE/RN CLOSING NOTES 



PATIENT IN BED RESTING. PATIENT IS ALERT AND ORIENTED X 4. PATIENTS BREATHING IS EVEN AND 
UNLABORED. NO SIGNS OF SOB OR RESPIRATORY DISTRESS NOTED. PATIENT STATES NO PAIN AT THIS 
TIME. PATIENT IN NO SIGNS OF ACUTE DISTRESS. IV ACCESS INTACT FLUSHING WELL. ALL NEEDS HAVE 
BEEN MET DURING SHIFT. SAFETY MEASURES ARE IN PLACE, BED IS LOCKED AND PLACED IN THE LOW 
POSITION, SIDE RAILS UP X 2, CALL LIGHT IS WITHIN REACH. WILL ENDORSE CARE TO DAY SHIFT 
NURSE.

## 2021-02-23 NOTE — NUR
TELE/RN OPENING NOTES 



RECEIVED PATIENT IN BED RESTING. PATIENT IS ALERT AND ORIENTED X 4. PATIENTS BREATHING IS 
EVEN AND UNLABORED. NO SIGNS OF SOB OR RESPIRATORY DISTRESS NOTED. PATIENT STATES NO PAIN AT 
THIS TIME. PATIENT IN NO SIGNS OF ACUTE DISTRESS. IV ACCESS INTACT FLUSHING WELL. SAFETY 
MEASURES ARE IN PLACE, BED IS LOCKED AND PLACED IN THE LOW POSITION, SIDE RAILS UP X 2, CALL 
LIGHT IS WITHIN REACH. WILL CONTINUE TO MONITOR THROUGH OUT SHIFT.

## 2021-02-24 VITALS — DIASTOLIC BLOOD PRESSURE: 46 MMHG | SYSTOLIC BLOOD PRESSURE: 148 MMHG

## 2021-02-24 VITALS — SYSTOLIC BLOOD PRESSURE: 135 MMHG | DIASTOLIC BLOOD PRESSURE: 72 MMHG

## 2021-02-24 LAB
BASOPHILS # BLD AUTO: 0.1 /CMM (ref 0–0.2)
BASOPHILS NFR BLD AUTO: 0.8 % (ref 0–2)
BUN SERPL-MCNC: 15 MG/DL (ref 7–18)
CALCIUM SERPL-MCNC: 8.9 MG/DL (ref 8.5–10.1)
CHLORIDE SERPL-SCNC: 105 MMOL/L (ref 98–107)
CO2 SERPL-SCNC: 26 MMOL/L (ref 21–32)
CREAT SERPL-MCNC: 1 MG/DL (ref 0.6–1.3)
EOSINOPHIL NFR BLD AUTO: 1.6 % (ref 0–6)
GLUCOSE SERPL-MCNC: 100 MG/DL (ref 74–106)
HCT VFR BLD AUTO: 42 % (ref 33–45)
HGB BLD-MCNC: 13.8 G/DL (ref 11.5–14.8)
LYMPHOCYTES NFR BLD AUTO: 1.7 /CMM (ref 0.8–4.8)
LYMPHOCYTES NFR BLD AUTO: 23.4 % (ref 20–44)
MAGNESIUM SERPL-MCNC: 2.2 MG/DL (ref 1.8–2.4)
MCHC RBC AUTO-ENTMCNC: 33 G/DL (ref 31–36)
MCV RBC AUTO: 89 FL (ref 82–100)
MONOCYTES NFR BLD AUTO: 0.5 /CMM (ref 0.1–1.3)
MONOCYTES NFR BLD AUTO: 7.5 % (ref 2–12)
NEUTROPHILS # BLD AUTO: 4.7 /CMM (ref 1.8–8.9)
NEUTROPHILS NFR BLD AUTO: 66.7 % (ref 43–81)
PLATELET # BLD AUTO: 266 /CMM (ref 150–450)
POTASSIUM SERPL-SCNC: 3.6 MMOL/L (ref 3.5–5.1)
RBC # BLD AUTO: 4.67 MIL/UL (ref 4–5.2)
SODIUM SERPL-SCNC: 139 MMOL/L (ref 136–145)
WBC NRBC COR # BLD AUTO: 7.1 K/UL (ref 4.3–11)

## 2021-02-24 RX ADMIN — SODIUM CHLORIDE SCH MG: 9 INJECTION, SOLUTION INTRAVENOUS at 09:44

## 2021-02-24 RX ADMIN — THYROID, PORCINE SCH MG: 30 TABLET ORAL at 09:43

## 2021-02-24 NOTE — NUR
RN Opening note



Received patient AO x 4 Italian speaking, patient does no appears pain or distress, skin is 
warm to touch  keep clean/dry, intact IV site. Kept elevated HOB for ensure airway and 
aspiration precaution also lower bed position with bed alarm on for safety. Call light 
within reach, will continue to monitor.

## 2021-02-24 NOTE — NUR
RN Closing note



Patient in bed resting comfortably, does no c/o pain or distress. Skin is warm to touch kept 
clean.dry, intact IV site. Respiratory even and unlabored on room air. Keep elevated HOB for 
ensure airway and aspiration precaution and lowest bed position for safety. Patient going 
d/c to home, martha will  patient at 2000. Call light within reach, will endorse 
night shift.

## 2021-06-26 ENCOUNTER — HOSPITAL ENCOUNTER (INPATIENT)
Dept: HOSPITAL 54 - ER | Age: 86
LOS: 3 days | Discharge: HOME | DRG: 133 | End: 2021-06-29
Attending: NURSE PRACTITIONER | Admitting: INTERNAL MEDICINE
Payer: MEDICAID

## 2021-06-26 VITALS — HEIGHT: 62 IN | BODY MASS INDEX: 24.11 KG/M2 | WEIGHT: 131 LBS

## 2021-06-26 VITALS — SYSTOLIC BLOOD PRESSURE: 172 MMHG | DIASTOLIC BLOOD PRESSURE: 85 MMHG

## 2021-06-26 DIAGNOSIS — E78.5: ICD-10-CM

## 2021-06-26 DIAGNOSIS — I50.33: ICD-10-CM

## 2021-06-26 DIAGNOSIS — D68.59: ICD-10-CM

## 2021-06-26 DIAGNOSIS — I77.1: ICD-10-CM

## 2021-06-26 DIAGNOSIS — I48.91: ICD-10-CM

## 2021-06-26 DIAGNOSIS — E83.42: ICD-10-CM

## 2021-06-26 DIAGNOSIS — J98.11: ICD-10-CM

## 2021-06-26 DIAGNOSIS — J06.9: ICD-10-CM

## 2021-06-26 DIAGNOSIS — Z79.899: ICD-10-CM

## 2021-06-26 DIAGNOSIS — K57.90: ICD-10-CM

## 2021-06-26 DIAGNOSIS — Z20.822: ICD-10-CM

## 2021-06-26 DIAGNOSIS — Z90.710: ICD-10-CM

## 2021-06-26 DIAGNOSIS — E03.9: ICD-10-CM

## 2021-06-26 DIAGNOSIS — I27.20: ICD-10-CM

## 2021-06-26 DIAGNOSIS — I11.0: ICD-10-CM

## 2021-06-26 DIAGNOSIS — M41.9: ICD-10-CM

## 2021-06-26 DIAGNOSIS — I08.0: ICD-10-CM

## 2021-06-26 DIAGNOSIS — J96.01: Primary | ICD-10-CM

## 2021-06-26 DIAGNOSIS — Z79.01: ICD-10-CM

## 2021-06-26 LAB
ALBUMIN SERPL BCP-MCNC: 3.4 G/DL (ref 3.4–5)
ALP SERPL-CCNC: 98 U/L (ref 46–116)
ALT SERPL W P-5'-P-CCNC: 26 U/L (ref 12–78)
AST SERPL W P-5'-P-CCNC: 40 U/L (ref 15–37)
BASOPHILS # BLD AUTO: 0.1 K/UL (ref 0–0.2)
BASOPHILS NFR BLD AUTO: 0.9 % (ref 0–2)
BILIRUB DIRECT SERPL-MCNC: 0.2 MG/DL (ref 0–0.2)
BILIRUB SERPL-MCNC: 1.1 MG/DL (ref 0.2–1)
BILIRUB UR QL STRIP: NEGATIVE
BUN SERPL-MCNC: 17 MG/DL (ref 7–18)
CALCIUM SERPL-MCNC: 8.8 MG/DL (ref 8.5–10.1)
CHLORIDE SERPL-SCNC: 105 MMOL/L (ref 98–107)
CO2 SERPL-SCNC: 23 MMOL/L (ref 21–32)
COLOR UR: YELLOW
CREAT SERPL-MCNC: 1 MG/DL (ref 0.6–1.3)
DEPRECATED SQUAMOUS URNS QL MICRO: (no result) /HPF
EOSINOPHIL NFR BLD AUTO: 1.3 % (ref 0–6)
GLUCOSE SERPL-MCNC: 110 MG/DL (ref 74–106)
GLUCOSE UR STRIP-MCNC: NEGATIVE MG/DL
HCT VFR BLD AUTO: 38 % (ref 33–45)
HGB BLD-MCNC: 12.6 G/DL (ref 11.5–14.8)
LEUKOCYTE ESTERASE UR QL STRIP: NEGATIVE
LYMPHOCYTES NFR BLD AUTO: 1.4 K/UL (ref 0.8–4.8)
LYMPHOCYTES NFR BLD AUTO: 17.7 % (ref 20–44)
MCHC RBC AUTO-ENTMCNC: 33 G/DL (ref 31–36)
MCV RBC AUTO: 91 FL (ref 82–100)
MONOCYTES NFR BLD AUTO: 0.6 K/UL (ref 0.1–1.3)
MONOCYTES NFR BLD AUTO: 7.2 % (ref 2–12)
NEUTROPHILS # BLD AUTO: 5.8 K/UL (ref 1.8–8.9)
NEUTROPHILS NFR BLD AUTO: 72.9 % (ref 43–81)
NITRITE UR QL STRIP: NEGATIVE
PH UR STRIP: 6 [PH] (ref 5–8)
PLATELET # BLD AUTO: 230 K/UL (ref 150–450)
POTASSIUM SERPL-SCNC: 4.1 MMOL/L (ref 3.5–5.1)
PROT SERPL-MCNC: 6.9 G/DL (ref 6.4–8.2)
PROT UR QL STRIP: 100 MG/DL
RBC # BLD AUTO: 4.18 MIL/UL (ref 4–5.2)
RBC #/AREA URNS HPF: (no result) /HPF (ref 0–2)
SODIUM SERPL-SCNC: 139 MMOL/L (ref 136–145)
UROBILINOGEN UR STRIP-MCNC: 0.2 EU/DL
WBC #/AREA URNS HPF: (no result) /HPF (ref 0–3)
WBC NRBC COR # BLD AUTO: 8 K/UL (ref 4.3–11)

## 2021-06-26 PROCEDURE — C9113 INJ PANTOPRAZOLE SODIUM, VIA: HCPCS

## 2021-06-26 PROCEDURE — G0378 HOSPITAL OBSERVATION PER HR: HCPCS

## 2021-06-26 PROCEDURE — C9803 HOPD COVID-19 SPEC COLLECT: HCPCS

## 2021-06-26 NOTE — NUR
PT BIBSONINLAW C/O SOB AND DIZZINESS X2 DAYS. PT AAOX4 BREATHING EVENLY AND 
UNLABORED. PT ATTACHED TO MONITOR AND POX. RIGHT WRIST 20G INITIATED. BLOOD 
OBTAINED AND SENT TO LAB. MD AT BEDSIDE. PT PLACED ON 2L O2 VIA NC FOR COMFORT. 
PT GIVEN BLANKET AND CALLLIGHT WITHIN REACH.

## 2021-06-27 VITALS — SYSTOLIC BLOOD PRESSURE: 137 MMHG | DIASTOLIC BLOOD PRESSURE: 54 MMHG

## 2021-06-27 VITALS — DIASTOLIC BLOOD PRESSURE: 65 MMHG | SYSTOLIC BLOOD PRESSURE: 131 MMHG

## 2021-06-27 VITALS — SYSTOLIC BLOOD PRESSURE: 111 MMHG | DIASTOLIC BLOOD PRESSURE: 57 MMHG

## 2021-06-27 VITALS — SYSTOLIC BLOOD PRESSURE: 122 MMHG | DIASTOLIC BLOOD PRESSURE: 58 MMHG

## 2021-06-27 VITALS — SYSTOLIC BLOOD PRESSURE: 173 MMHG | DIASTOLIC BLOOD PRESSURE: 87 MMHG

## 2021-06-27 VITALS — DIASTOLIC BLOOD PRESSURE: 76 MMHG | SYSTOLIC BLOOD PRESSURE: 144 MMHG

## 2021-06-27 LAB
BASOPHILS # BLD AUTO: 0.1 K/UL (ref 0–0.2)
BASOPHILS NFR BLD AUTO: 0.7 % (ref 0–2)
BUN SERPL-MCNC: 14 MG/DL (ref 7–18)
CALCIUM SERPL-MCNC: 8.2 MG/DL (ref 8.5–10.1)
CHLORIDE SERPL-SCNC: 106 MMOL/L (ref 98–107)
CHOLEST SERPL-MCNC: 126 MG/DL (ref ?–200)
CO2 SERPL-SCNC: 26 MMOL/L (ref 21–32)
CREAT SERPL-MCNC: 0.8 MG/DL (ref 0.6–1.3)
EOSINOPHIL NFR BLD AUTO: 0.7 % (ref 0–6)
GLUCOSE SERPL-MCNC: 100 MG/DL (ref 74–106)
HCT VFR BLD AUTO: 33 % (ref 33–45)
HDLC SERPL-MCNC: 43 MG/DL (ref 40–60)
HGB BLD-MCNC: 11 G/DL (ref 11.5–14.8)
LDLC SERPL DIRECT ASSAY-MCNC: 71 MG/DL (ref 0–99)
LYMPHOCYTES NFR BLD AUTO: 1.1 K/UL (ref 0.8–4.8)
LYMPHOCYTES NFR BLD AUTO: 12.8 % (ref 20–44)
MAGNESIUM SERPL-MCNC: 1.7 MG/DL (ref 1.8–2.4)
MCHC RBC AUTO-ENTMCNC: 34 G/DL (ref 31–36)
MCV RBC AUTO: 91 FL (ref 82–100)
MONOCYTES NFR BLD AUTO: 0.5 K/UL (ref 0.1–1.3)
MONOCYTES NFR BLD AUTO: 5.5 % (ref 2–12)
NEUTROPHILS # BLD AUTO: 7.2 K/UL (ref 1.8–8.9)
NEUTROPHILS NFR BLD AUTO: 80.3 % (ref 43–81)
PHOSPHATE SERPL-MCNC: 3.6 MG/DL (ref 2.5–4.9)
PLATELET # BLD AUTO: 172 K/UL (ref 150–450)
POTASSIUM SERPL-SCNC: 4 MMOL/L (ref 3.5–5.1)
RBC # BLD AUTO: 3.6 MIL/UL (ref 4–5.2)
SODIUM SERPL-SCNC: 140 MMOL/L (ref 136–145)
TRIGL SERPL-MCNC: 76 MG/DL (ref 30–150)
TSH SERPL DL<=0.005 MIU/L-ACNC: 1.59 UIU/ML (ref 0.36–3.74)
WBC NRBC COR # BLD AUTO: 9 K/UL (ref 4.3–11)

## 2021-06-27 RX ADMIN — SODIUM CHLORIDE PRN MLS/HR: 4.5 INJECTION, SOLUTION INTRAVENOUS at 15:36

## 2021-06-27 RX ADMIN — THYROID, PORCINE SCH MG: 30 TABLET ORAL at 08:48

## 2021-06-27 RX ADMIN — MAGNESIUM SULFATE IN DEXTROSE SCH MLS/HR: 10 INJECTION, SOLUTION INTRAVENOUS at 11:30

## 2021-06-27 RX ADMIN — SOTALOL HYDROCHLORIDE SCH MG: 80 TABLET ORAL at 08:30

## 2021-06-27 RX ADMIN — AMLODIPINE BESYLATE SCH MG: 5 TABLET ORAL at 08:28

## 2021-06-27 RX ADMIN — SOTALOL HYDROCHLORIDE SCH MG: 80 TABLET ORAL at 08:57

## 2021-06-27 RX ADMIN — SODIUM CHLORIDE PRN MLS/HR: 4.5 INJECTION, SOLUTION INTRAVENOUS at 00:00

## 2021-06-27 RX ADMIN — SIMVASTATIN SCH MG: 10 TABLET, FILM COATED ORAL at 17:28

## 2021-06-27 RX ADMIN — RIVAROXABAN SCH MG: 10 TABLET, FILM COATED ORAL at 17:29

## 2021-06-27 RX ADMIN — AMLODIPINE BESYLATE SCH MG: 5 TABLET ORAL at 08:56

## 2021-06-27 RX ADMIN — LISINOPRIL SCH MG: 20 TABLET ORAL at 08:57

## 2021-06-27 RX ADMIN — FUROSEMIDE SCH MG: 10 INJECTION, SOLUTION INTRAMUSCULAR; INTRAVENOUS at 17:27

## 2021-06-27 RX ADMIN — MAGNESIUM SULFATE IN DEXTROSE SCH MLS/HR: 10 INJECTION, SOLUTION INTRAVENOUS at 10:20

## 2021-06-27 RX ADMIN — ISONIAZID SCH MG: 300 TABLET ORAL at 08:49

## 2021-06-27 RX ADMIN — FUROSEMIDE SCH MG: 10 INJECTION, SOLUTION INTRAMUSCULAR; INTRAVENOUS at 20:49

## 2021-06-27 RX ADMIN — DEXTROSE MONOHYDRATE SCH MLS/HR: 50 INJECTION, SOLUTION INTRAVENOUS at 20:17

## 2021-06-27 NOTE — NUR
RN NOTE

PATIENT IS IN BED WITH HOB AT SEMI FOWLERS POSITION. PATIENT IS AOX4. PATIENT IS ON 4L NC 
WITH NO SIGNS OF LABORED BREATHING. PATIENT IS AWAITING MIDLINE INSERTION. BED IS LOCKED IN 
THE LOWEST POSITION, 3 GUARD RAILS RAISED, CALL BELL WITHIN REACH, AND ALL HOSPITAL SAFETY 
PRECAUTIONS ARE BEING FOLLOWED. ALL DUE MEDS GIVEN AND PATIENT REMAINED STABLE THROUGHOUT 
SHIFT. WILL ENDORSE TO NIGHT SHIFT RN.

## 2021-06-27 NOTE — NUR
PER RN BRIJESH, UNABLE TO GET IV ACCESS FOR CT PULMONARY ANGIOGRAM. PT TO GET MIDLINE 
TOMORROW 6/28. CT EXAM ON HOLD. PLEASE CALL RADIOLOGY EXT [phone] WHEN HAVE PT IV/MIDLINE 
ACCESS

## 2021-06-27 NOTE — NUR
RN notes



Admitted from ER via [facility] with diagnosis of Respiratory Failure secondary to PNA an 88 
year old female from Home with complaining of SOB/cough/weakness and lethargy fo 2 days. 
Patient is alert and oriented, verbally able to communicate in Indonesian. Hardly knows 
english. On 2 lpm O2 via nasal cannula tolerating well. Noted with elevated /85. Gave 
hydrazaline with help. Noted with heartburn, maalox given with relief. Noted with nausea and 
vomiting administered zofran 4mg IV, wtih help. Kept clean and dry. Will endorse to next 
shift for continuity of care.

## 2021-06-27 NOTE — NUR
RN NOTE



PT HAD VTACH FOR 5 SEC,DR WEST NOTIFIED,NO NEW ORDER,PER  MONITOR MG AND CMP IN THE 
MORNING.

## 2021-06-27 NOTE — NUR
RN NOTE



RECEIVED PT IN BED A/A/O X3,ON 4L VIA NC SATING 94%,HAS UNLABORED BREATHING,PT ON TELE 
MONITOR SHOWING SR IN 60s.

## 2021-06-27 NOTE — NUR
RN NOTE

PATIENT'S RWRIST #20 IS BLEEDING. IV SITE REMOVED. AWAITING MIDLINE INSERTION. 

-------------------------------------------------------------------------------

Addendum: 06/27/21 at 1920 by BRIJESH OWEN RN

-------------------------------------------------------------------------------

SHARRON PELAYO AND RANJEET NURSE AWARE

## 2021-06-27 NOTE — NUR
RN NOTE

PATIENT IS IN BED WITH HOB AT SEMI FOWLERS POSITION. PATIENT IS AOX4. PATIENT IS ON 2L NC 
WITH NO SIGNS OF LABORED BREATHING. RWRIST #20 IS PATENT AND INTACT. BED IS LOCKED IN THE 
LOWEST POSITION, 3 GUARD RAILS RAISED, CALL BELL WITHIN REACH, AND ALL HOSPITAL SAFETY 
PRECAUTIONS ARE BEING FOLLOWED. WILL CONTINUE TO MONITOR THROUGHOUT SHIFT.

## 2021-06-28 VITALS — SYSTOLIC BLOOD PRESSURE: 115 MMHG | DIASTOLIC BLOOD PRESSURE: 50 MMHG

## 2021-06-28 VITALS — DIASTOLIC BLOOD PRESSURE: 59 MMHG | SYSTOLIC BLOOD PRESSURE: 120 MMHG

## 2021-06-28 VITALS — DIASTOLIC BLOOD PRESSURE: 58 MMHG | SYSTOLIC BLOOD PRESSURE: 117 MMHG

## 2021-06-28 VITALS — SYSTOLIC BLOOD PRESSURE: 128 MMHG | DIASTOLIC BLOOD PRESSURE: 58 MMHG

## 2021-06-28 VITALS — DIASTOLIC BLOOD PRESSURE: 54 MMHG | SYSTOLIC BLOOD PRESSURE: 103 MMHG

## 2021-06-28 VITALS — SYSTOLIC BLOOD PRESSURE: 131 MMHG | DIASTOLIC BLOOD PRESSURE: 67 MMHG

## 2021-06-28 LAB
BASOPHILS # BLD AUTO: 0 K/UL (ref 0–0.2)
BASOPHILS NFR BLD AUTO: 0.6 % (ref 0–2)
BUN SERPL-MCNC: 17 MG/DL (ref 7–18)
CALCIUM SERPL-MCNC: 8.1 MG/DL (ref 8.5–10.1)
CHLORIDE SERPL-SCNC: 103 MMOL/L (ref 98–107)
CO2 SERPL-SCNC: 31 MMOL/L (ref 21–32)
CREAT SERPL-MCNC: 1 MG/DL (ref 0.6–1.3)
EOSINOPHIL NFR BLD AUTO: 1.2 % (ref 0–6)
GLUCOSE SERPL-MCNC: 92 MG/DL (ref 74–106)
HCT VFR BLD AUTO: 35 % (ref 33–45)
HGB BLD-MCNC: 11.5 G/DL (ref 11.5–14.8)
LYMPHOCYTES NFR BLD AUTO: 1.4 K/UL (ref 0.8–4.8)
LYMPHOCYTES NFR BLD AUTO: 21.7 % (ref 20–44)
MAGNESIUM SERPL-MCNC: 2.1 MG/DL (ref 1.8–2.4)
MCHC RBC AUTO-ENTMCNC: 33 G/DL (ref 31–36)
MCV RBC AUTO: 91 FL (ref 82–100)
MONOCYTES NFR BLD AUTO: 0.5 K/UL (ref 0.1–1.3)
MONOCYTES NFR BLD AUTO: 8.3 % (ref 2–12)
NEUTROPHILS # BLD AUTO: 4.4 K/UL (ref 1.8–8.9)
NEUTROPHILS NFR BLD AUTO: 68.2 % (ref 43–81)
PLATELET # BLD AUTO: 169 K/UL (ref 150–450)
POTASSIUM SERPL-SCNC: 3.4 MMOL/L (ref 3.5–5.1)
RBC # BLD AUTO: 3.81 MIL/UL (ref 4–5.2)
SODIUM SERPL-SCNC: 140 MMOL/L (ref 136–145)
WBC NRBC COR # BLD AUTO: 6.4 K/UL (ref 4.3–11)

## 2021-06-28 RX ADMIN — RIVAROXABAN SCH MG: 10 TABLET, FILM COATED ORAL at 17:32

## 2021-06-28 RX ADMIN — LISINOPRIL SCH MG: 20 TABLET ORAL at 09:00

## 2021-06-28 RX ADMIN — POTASSIUM CHLORIDE SCH MEQ: 1500 TABLET, EXTENDED RELEASE ORAL at 11:57

## 2021-06-28 RX ADMIN — SIMVASTATIN SCH MG: 10 TABLET, FILM COATED ORAL at 17:33

## 2021-06-28 RX ADMIN — AMIODARONE HYDROCHLORIDE ONE MLS/HR: 50 INJECTION, SOLUTION INTRAVENOUS at 09:35

## 2021-06-28 RX ADMIN — DEXTROSE MONOHYDRATE PRN MLS/HR: 50 INJECTION, SOLUTION INTRAVENOUS at 11:13

## 2021-06-28 RX ADMIN — ISONIAZID SCH MG: 300 TABLET ORAL at 09:15

## 2021-06-28 RX ADMIN — POTASSIUM CHLORIDE SCH MEQ: 1500 TABLET, EXTENDED RELEASE ORAL at 09:15

## 2021-06-28 RX ADMIN — DEXTROSE MONOHYDRATE SCH MLS/HR: 50 INJECTION, SOLUTION INTRAVENOUS at 22:49

## 2021-06-28 RX ADMIN — DEXTROSE MONOHYDRATE PRN MLS/HR: 50 INJECTION, SOLUTION INTRAVENOUS at 11:15

## 2021-06-28 RX ADMIN — SOTALOL HYDROCHLORIDE SCH MG: 80 TABLET ORAL at 17:33

## 2021-06-28 RX ADMIN — POTASSIUM CHLORIDE SCH MEQ: 1500 TABLET, EXTENDED RELEASE ORAL at 10:43

## 2021-06-28 RX ADMIN — SOTALOL HYDROCHLORIDE SCH MG: 80 TABLET ORAL at 09:00

## 2021-06-28 RX ADMIN — AMLODIPINE BESYLATE SCH MG: 5 TABLET ORAL at 09:00

## 2021-06-28 RX ADMIN — THYROID, PORCINE SCH MG: 30 TABLET ORAL at 09:15

## 2021-06-28 RX ADMIN — DEXTROSE MONOHYDRATE SCH MLS/HR: 50 INJECTION, SOLUTION INTRAVENOUS at 21:01

## 2021-06-28 RX ADMIN — AMIODARONE HYDROCHLORIDE ONE MLS/HR: 50 INJECTION, SOLUTION INTRAVENOUS at 10:38

## 2021-06-28 NOTE — NUR
RN NOTE

PATIENT IS SINUS RHYTHM 60BPM ON AMIODARONE DRIP. DR. MCCABE NOTIFIED. OKAY TO DISCONTINUE 
DRIP. WILL CONTINUE TO MONITOR.

## 2021-06-28 NOTE — NUR
RN NOTE

PATIENT IS IN BED WITH HOB AT SEMI FOWLERS POSITION. PATIENT IS AOX4. PATIENT IS ON 4L NC 
WITH NO SIGNS OF LABORED BREATHING. RFA #22 IS PATENT AND INTACT. BED IS LOCKED IN THE 
LOWEST POSITION, 3 GUARD RAILS RAISED, CALL BELL WITHIN REACH, AND ALL HOSPITAL SAFETY 
PRECAUTIONS ARE BEING FOLLOWED. WILL CONTINUE TO MONITOR THROUGHOUT SHIFT.

## 2021-06-28 NOTE — NUR
RN NOTE

PATIENT IS IN BED WITH HOB AT SEMI FOWLERS POSITION. PATIENT IS AOX4. PATIENT IS ON 4L NC 
WITH NO SIGNS OF LABORED BREATHING. LFA #22 AND RAC #18 ARE PATENT AND INTACT. BED IS LOCKED 
IN THE LOWEST POSITION, 3 GUARD RAILS RAISED, CALL BELL WITHIN REACH, AND ALL HOSPITAL 
SAFETY PRECAUTIONS ARE BEING FOLLOWED. ALL DUE MEDS GIVEN AND PATIENT REMAINED STABLE 
THROUGHOUT SHIFT. WILL ENDORSE TO NIGHT SHIFT RN.

## 2021-06-29 VITALS — SYSTOLIC BLOOD PRESSURE: 122 MMHG | DIASTOLIC BLOOD PRESSURE: 51 MMHG

## 2021-06-29 VITALS — DIASTOLIC BLOOD PRESSURE: 45 MMHG | SYSTOLIC BLOOD PRESSURE: 113 MMHG

## 2021-06-29 VITALS — SYSTOLIC BLOOD PRESSURE: 127 MMHG | DIASTOLIC BLOOD PRESSURE: 59 MMHG

## 2021-06-29 VITALS — SYSTOLIC BLOOD PRESSURE: 142 MMHG | DIASTOLIC BLOOD PRESSURE: 61 MMHG

## 2021-06-29 VITALS — SYSTOLIC BLOOD PRESSURE: 121 MMHG | DIASTOLIC BLOOD PRESSURE: 51 MMHG

## 2021-06-29 LAB
BASE EXCESS BLDA CALC-SCNC: 2.3 MMOL/L
BASOPHILS # BLD AUTO: 0 K/UL (ref 0–0.2)
BASOPHILS NFR BLD AUTO: 0.5 % (ref 0–2)
BUN SERPL-MCNC: 23 MG/DL (ref 7–18)
CALCIUM SERPL-MCNC: 8.5 MG/DL (ref 8.5–10.1)
CHLORIDE SERPL-SCNC: 106 MMOL/L (ref 98–107)
CO2 SERPL-SCNC: 33 MMOL/L (ref 21–32)
CREAT SERPL-MCNC: 0.9 MG/DL (ref 0.6–1.3)
DO-HGB MFR BLDA: 45.1 MMHG
EOSINOPHIL NFR BLD AUTO: 2.1 % (ref 0–6)
GLUCOSE SERPL-MCNC: 94 MG/DL (ref 74–106)
HCT VFR BLD AUTO: 33 % (ref 33–45)
HGB BLD-MCNC: 11 G/DL (ref 11.5–14.8)
INHALED O2 CONCENTRATION: 21 %
LYMPHOCYTES NFR BLD AUTO: 1.4 K/UL (ref 0.8–4.8)
LYMPHOCYTES NFR BLD AUTO: 20.4 % (ref 20–44)
MAGNESIUM SERPL-MCNC: 2 MG/DL (ref 1.8–2.4)
MCHC RBC AUTO-ENTMCNC: 33 G/DL (ref 31–36)
MCV RBC AUTO: 91 FL (ref 82–100)
MONOCYTES NFR BLD AUTO: 0.5 K/UL (ref 0.1–1.3)
MONOCYTES NFR BLD AUTO: 8.1 % (ref 2–12)
NEUTROPHILS # BLD AUTO: 4.6 K/UL (ref 1.8–8.9)
NEUTROPHILS NFR BLD AUTO: 68.9 % (ref 43–81)
PCO2 TEMP ADJ BLDA: 45.8 MMHG (ref 35–45)
PH TEMP ADJ BLDA: 7.4 [PH] (ref 7.35–7.45)
PLATELET # BLD AUTO: 184 K/UL (ref 150–450)
PO2 TEMP ADJ BLDA: 49.8 MMHG (ref 75–100)
POTASSIUM SERPL-SCNC: 4.6 MMOL/L (ref 3.5–5.1)
RBC # BLD AUTO: 3.68 MIL/UL (ref 4–5.2)
SAO2 % BLDA: 85.5 % (ref 92–98.5)
SODIUM SERPL-SCNC: 139 MMOL/L (ref 136–145)
WBC NRBC COR # BLD AUTO: 6.7 K/UL (ref 4.3–11)

## 2021-06-29 RX ADMIN — ISONIAZID SCH MG: 300 TABLET ORAL at 09:23

## 2021-06-29 RX ADMIN — LISINOPRIL SCH MG: 20 TABLET ORAL at 09:23

## 2021-06-29 RX ADMIN — SOTALOL HYDROCHLORIDE SCH MG: 80 TABLET ORAL at 09:22

## 2021-06-29 RX ADMIN — THYROID, PORCINE SCH MG: 30 TABLET ORAL at 09:23

## 2021-06-29 RX ADMIN — DEXTROSE MONOHYDRATE SCH MLS/HR: 50 INJECTION, SOLUTION INTRAVENOUS at 09:26

## 2021-06-29 RX ADMIN — SIMVASTATIN SCH MG: 10 TABLET, FILM COATED ORAL at 17:14

## 2021-06-29 RX ADMIN — RIVAROXABAN SCH MG: 10 TABLET, FILM COATED ORAL at 17:16

## 2021-06-29 RX ADMIN — SOTALOL HYDROCHLORIDE SCH MG: 80 TABLET ORAL at 17:14

## 2021-06-29 NOTE — NUR
RN NOTE

PATIENT IS IN BED WITH HOB AT SEMI FOWLERS POSITION. PATIENT IS AOX4. PATIENT IS ON 4L NC 
WITH NO SIGNS OF LABORED BREATHING. RHAND #20 IS PATENT AND INTACT. BED IS LOCKED IN THE 
LOWEST POSITION, 3 GUARD RAILS RAISED, CALL BELL WITHIN REACH, AND ALL HOSPITAL SAFETY 
PRECAUTIONS ARE BEING FOLLOWED. WILL CONTINUE TO MONITOR THROUGHOUT SHIFT. 

-------------------------------------------------------------------------------

Addendum: 06/29/21 at 0749 by BRIJESH OWEN RN

-------------------------------------------------------------------------------

*RFA #20

## 2021-06-29 NOTE — NUR
RN NOTE

PATIENT IS IN BED WITH HOB AT SEMI FOWLERS POSITION. PATIENT IS AOX4. PATIENT IS ON 2L NC 
WITH NO SIGNS OF LABORED BREATHING. BED IS LOCKED IN THE LOWEST POSITION, 3 GUARD RAILS 
RAISED, CALL BELL WITHIN REACH, AND ALL HOSPITAL SAFETY PRECAUTIONS ARE BEING FOLLOWED. WILL 
CONTINUE TO MONITOR THROUGHOUT SHIFT. ALL DUE MEDS GIVEN AND PATIENT REMAINED STABLE 
THROUGHOUT SHIFT. AWAITING JESSICA COX FOR DC.

## 2021-06-29 NOTE — NUR
RN NOTE

CONTACTED JERAMY COX, EXPLAINED RISKS AND BENEFITS OF PNEUMOCOCCAL VACCINE. WOULD LIKE 
PATIENT TO RECEIVE VACCINE. DR. BRIGID GRECO.

## 2021-06-29 NOTE — NUR
RN NOTE

PLACED PATIENT ON ROOM AIR PRIOR TO DC. PATIENT SATURATING 88% ON ROOM AIR AND FELT DIZZY. 
DR. RAINEY AWARE AND ORDERED STAT ABG. ABG RESULTS RELAYED TO DR. RAINEY AND WANTS TO SENT 
PATIENT HOME ON O2.  NOTIFIED.

## 2021-06-29 NOTE — NUR
TELE RN NOTES



PATIENT IS IN BED WITH HOB AT SEMI FOWLERS POSITION. PATIENT IS AOX4. PATIENT IS ON 2L NC 
WITH NO SIGNS OF LABORED BREATHING. BED IS LOCKED IN THE LOWEST POSITION, 3 GUARD RAILS 
RAISED, CALL BELL WITHIN REACH, AND ALL HOSPITAL SAFETY PRECAUTIONS ARE BEING FOLLOWED. PT 
IN STABLE CONDITION AT THIS TIME ALL PAPERWORK GIVEN DISCHARGE INSTRUCTIONS GIVEN BOTH 
VERBALLY AND WRITTEN. PT ESCORTED TO EXIT  TRANSPORTED WITH WHEEL CHAIR.

## 2021-09-28 ENCOUNTER — HOSPITAL ENCOUNTER (INPATIENT)
Dept: HOSPITAL 54 - ER | Age: 86
LOS: 4 days | Discharge: HOME | DRG: 137 | End: 2021-10-02
Attending: INTERNAL MEDICINE | Admitting: INTERNAL MEDICINE
Payer: MEDICAID

## 2021-09-28 VITALS — SYSTOLIC BLOOD PRESSURE: 109 MMHG | DIASTOLIC BLOOD PRESSURE: 58 MMHG

## 2021-09-28 VITALS — SYSTOLIC BLOOD PRESSURE: 175 MMHG | DIASTOLIC BLOOD PRESSURE: 89 MMHG

## 2021-09-28 VITALS — HEIGHT: 60 IN | WEIGHT: 136 LBS | BODY MASS INDEX: 26.7 KG/M2

## 2021-09-28 VITALS — DIASTOLIC BLOOD PRESSURE: 63 MMHG | SYSTOLIC BLOOD PRESSURE: 122 MMHG

## 2021-09-28 DIAGNOSIS — J15.6: Primary | ICD-10-CM

## 2021-09-28 DIAGNOSIS — Z79.01: ICD-10-CM

## 2021-09-28 DIAGNOSIS — I27.20: ICD-10-CM

## 2021-09-28 DIAGNOSIS — E03.9: ICD-10-CM

## 2021-09-28 DIAGNOSIS — I10: ICD-10-CM

## 2021-09-28 DIAGNOSIS — Z20.822: ICD-10-CM

## 2021-09-28 DIAGNOSIS — G93.40: ICD-10-CM

## 2021-09-28 DIAGNOSIS — E43: ICD-10-CM

## 2021-09-28 DIAGNOSIS — E86.0: ICD-10-CM

## 2021-09-28 DIAGNOSIS — N17.9: ICD-10-CM

## 2021-09-28 DIAGNOSIS — N14.1: ICD-10-CM

## 2021-09-28 DIAGNOSIS — J96.01: ICD-10-CM

## 2021-09-28 DIAGNOSIS — I48.91: ICD-10-CM

## 2021-09-28 LAB
ALBUMIN SERPL BCP-MCNC: 3.2 G/DL (ref 3.4–5)
ALP SERPL-CCNC: 107 U/L (ref 46–116)
ALT SERPL W P-5'-P-CCNC: 28 U/L (ref 12–78)
AST SERPL W P-5'-P-CCNC: 37 U/L (ref 15–37)
BASOPHILS # BLD AUTO: 0.2 K/UL (ref 0–0.2)
BASOPHILS NFR BLD AUTO: 2.2 % (ref 0–2)
BILIRUB DIRECT SERPL-MCNC: 0.3 MG/DL (ref 0–0.2)
BILIRUB SERPL-MCNC: 0.9 MG/DL (ref 0.2–1)
BUN SERPL-MCNC: 15 MG/DL (ref 7–18)
CALCIUM SERPL-MCNC: 8.8 MG/DL (ref 8.5–10.1)
CHLORIDE SERPL-SCNC: 103 MMOL/L (ref 98–107)
CO2 SERPL-SCNC: 28 MMOL/L (ref 21–32)
CREAT SERPL-MCNC: 0.9 MG/DL (ref 0.6–1.3)
EOSINOPHIL NFR BLD AUTO: 1.4 % (ref 0–6)
FERRITIN SERPL-MCNC: 141 NG/ML (ref 8–388)
GLUCOSE SERPL-MCNC: 130 MG/DL (ref 74–106)
HCT VFR BLD AUTO: 43 % (ref 33–45)
HGB BLD-MCNC: 13.9 G/DL (ref 11.5–14.8)
LYMPHOCYTES NFR BLD AUTO: 0.9 K/UL (ref 0.8–4.8)
LYMPHOCYTES NFR BLD AUTO: 8.2 % (ref 20–44)
MCHC RBC AUTO-ENTMCNC: 32 G/DL (ref 31–36)
MCV RBC AUTO: 92 FL (ref 82–100)
MONOCYTES NFR BLD AUTO: 0.5 K/UL (ref 0.1–1.3)
MONOCYTES NFR BLD AUTO: 4.7 % (ref 2–12)
NEUTROPHILS # BLD AUTO: 9.3 K/UL (ref 1.8–8.9)
NEUTROPHILS NFR BLD AUTO: 83.5 % (ref 43–81)
PLATELET # BLD AUTO: 282 K/UL (ref 150–450)
POTASSIUM SERPL-SCNC: 4.1 MMOL/L (ref 3.5–5.1)
PROT SERPL-MCNC: 7.5 G/DL (ref 6.4–8.2)
RBC # BLD AUTO: 4.68 MIL/UL (ref 4–5.2)
SODIUM SERPL-SCNC: 138 MMOL/L (ref 136–145)
WBC NRBC COR # BLD AUTO: 11.1 K/UL (ref 4.3–11)

## 2021-09-28 PROCEDURE — U0003 INFECTIOUS AGENT DETECTION BY NUCLEIC ACID (DNA OR RNA); SEVERE ACUTE RESPIRATORY SYNDROME CORONAVIRUS 2 (SARS-COV-2) (CORONAVIRUS DISEASE [COVID-19]), AMPLIFIED PROBE TECHNIQUE, MAKING USE OF HIGH THROUGHPUT TECHNOLOGIES AS DESCRIBED BY CMS-2020-01-R: HCPCS

## 2021-09-28 PROCEDURE — C9803 HOPD COVID-19 SPEC COLLECT: HCPCS

## 2021-09-28 PROCEDURE — G0378 HOSPITAL OBSERVATION PER HR: HCPCS

## 2021-09-28 RX ADMIN — DRONEDARONE SCH MG: 400 TABLET, FILM COATED ORAL at 16:48

## 2021-09-28 RX ADMIN — DEXTROSE MONOHYDRATE SCH MLS/HR: 50 INJECTION, SOLUTION INTRAVENOUS at 12:55

## 2021-09-28 RX ADMIN — ISONIAZID SCH MG: 300 TABLET ORAL at 12:40

## 2021-09-28 RX ADMIN — SIMVASTATIN SCH MG: 10 TABLET, FILM COATED ORAL at 18:17

## 2021-09-28 RX ADMIN — AMLODIPINE BESYLATE SCH MG: 5 TABLET ORAL at 12:39

## 2021-09-28 RX ADMIN — FUROSEMIDE SCH MG: 40 TABLET ORAL at 12:40

## 2021-09-28 RX ADMIN — FLUTICASONE FUROATE AND VILANTEROL TRIFENATATE SCH EACH: 100; 25 POWDER RESPIRATORY (INHALATION) at 12:38

## 2021-09-28 RX ADMIN — LORAZEPAM PRN MG: 1 TABLET ORAL at 19:38

## 2021-09-28 RX ADMIN — DEXTROSE MONOHYDRATE SCH MLS/HR: 50 INJECTION, SOLUTION INTRAVENOUS at 13:40

## 2021-09-28 RX ADMIN — SOTALOL HYDROCHLORIDE SCH MG: 80 TABLET ORAL at 12:41

## 2021-09-28 RX ADMIN — FUROSEMIDE SCH MG: 40 TABLET ORAL at 16:49

## 2021-09-28 RX ADMIN — FUROSEMIDE SCH MG: 40 TABLET ORAL at 12:55

## 2021-09-28 RX ADMIN — RIVAROXABAN SCH MG: 15 TABLET, FILM COATED ORAL at 16:49

## 2021-09-28 RX ADMIN — DRONEDARONE SCH MG: 400 TABLET, FILM COATED ORAL at 12:40

## 2021-09-28 NOTE — NUR
RN NOTE



PT C/O FEELING ANXIOUS AND FAMILY STATES SHE FEELS VERY ANXIOUS THAT SHE'S IN THE HOSPITAL. 
GIVEN ATIVAN 1MG PO FOR ANXIETY AS ORDERED. MADE COMFORTABLE IN BED. WILL CONT TO MONITOR.

## 2021-09-28 NOTE — NUR
RN CLOSING NOTE- PT ALERT ORIENTED PERSON PLACE TIME. FAMILY AT BEDSIDE. ATIVAN ORDERED FOR 
ANXIETY, VS STABLE , NEEDS ATTENDED, BED SIDE RAILS UP, BED LOCKED CALL LIGHT IN REACH, 
REPORT GIVEN TO NOC SHIFT.

## 2021-09-28 NOTE — NUR
BIB RA C/O SOB SINCE 1 AM. RECIEVED BREATHING TREATMENT EN ROUTE. UPON ARRIVAL 
PATIENT PRESENTED WITH LABORED RESPIRATIONS RR26 O2 SAT 95% DIMISHED LUNG 
SOUNDS AT BILATERAL BASES. PT PLACED ON CARDIAC MONITOR AND PULSE OX 
HYPERTESNIVE 173/94. PT WAS AT BEDSIDE FOR EVAL RT CALLED FOR BREATHING 
TREATMENT.

## 2021-09-28 NOTE — NUR
RN ADMISSION NOTE- 89 Y/O FEMALE BROUGHT FROM HOME FOR CP / SOB. PMHX - AFIB/ STENT, / 
ABLATION TEN YRS AGO . CARDIAC DISEASE, HTN, GERD, HLD, THYROID ISSUES. PT IS ALERT ORIENTED 
PERSON PLACE TIME PURPOSE. VS - BP- 175/89. HR- 82, RR- 17, T- 98.7, 02 SATS 96 % 2LPM. PT 
PLACED ON TELE MONITOR, CHEST W DIMINISHED BREATH SOUNDS THROUGHOUT, DENIES PAIN AT PRESENT. 
SPOKE W SON REGARDING ADMISSION. DR ROPER ORDERS RECEIVED. SIDE RAILS UP X TWO, BED 
LOCKED. CALL LIGHT IN REACH . MONITOR AND ASSIST

## 2021-09-28 NOTE — NUR
RN NOTE



RECEIVED PT AWAKE IN BED, A/OX2, VERBAL, WITH FAMILY AT BEDSIDE. NO C/O PAIN OR DISCOMFORT 
AT THIS TIME. RESPIRATIONS EVEN/UNLABORED. SHE DENIES SOB. ON O2 @2LPM VIA NC. IV SITE R-AC 
INTACT/PATENT/FLUSHES WELL. TELE MONITOR CURRENTLY READING SR, HR 64. PT IN NO ACUTE 
DISTRESS. SAFETY MEASURES IN PLACE, BED IN LOWEST LOCKED POSITION, S/R UPX2, CALL LIGHT 
WITHIN REACH. WILL CONT TO MONITOR.

## 2021-09-29 VITALS — SYSTOLIC BLOOD PRESSURE: 110 MMHG | DIASTOLIC BLOOD PRESSURE: 46 MMHG

## 2021-09-29 VITALS — DIASTOLIC BLOOD PRESSURE: 58 MMHG | SYSTOLIC BLOOD PRESSURE: 102 MMHG

## 2021-09-29 VITALS — SYSTOLIC BLOOD PRESSURE: 138 MMHG | DIASTOLIC BLOOD PRESSURE: 89 MMHG

## 2021-09-29 VITALS — DIASTOLIC BLOOD PRESSURE: 55 MMHG | SYSTOLIC BLOOD PRESSURE: 101 MMHG

## 2021-09-29 VITALS — DIASTOLIC BLOOD PRESSURE: 53 MMHG | SYSTOLIC BLOOD PRESSURE: 100 MMHG

## 2021-09-29 LAB
ALBUMIN SERPL BCP-MCNC: 2.9 G/DL (ref 3.4–5)
ALP SERPL-CCNC: 93 U/L (ref 46–116)
ALT SERPL W P-5'-P-CCNC: 27 U/L (ref 12–78)
AST SERPL W P-5'-P-CCNC: 34 U/L (ref 15–37)
BASOPHILS # BLD AUTO: 0 K/UL (ref 0–0.2)
BASOPHILS NFR BLD AUTO: 0.1 % (ref 0–2)
BILIRUB SERPL-MCNC: 0.7 MG/DL (ref 0.2–1)
BUN SERPL-MCNC: 28 MG/DL (ref 7–18)
CALCIUM SERPL-MCNC: 8.7 MG/DL (ref 8.5–10.1)
CHLORIDE SERPL-SCNC: 102 MMOL/L (ref 98–107)
CO2 SERPL-SCNC: 29 MMOL/L (ref 21–32)
CREAT SERPL-MCNC: 1.4 MG/DL (ref 0.6–1.3)
EOSINOPHIL NFR BLD AUTO: 0 % (ref 0–6)
GLUCOSE SERPL-MCNC: 124 MG/DL (ref 74–106)
HCT VFR BLD AUTO: 40 % (ref 33–45)
HGB BLD-MCNC: 13 G/DL (ref 11.5–14.8)
LYMPHOCYTES NFR BLD AUTO: 1 K/UL (ref 0.8–4.8)
LYMPHOCYTES NFR BLD AUTO: 8.6 % (ref 20–44)
MAGNESIUM SERPL-MCNC: 2 MG/DL (ref 1.8–2.4)
MCHC RBC AUTO-ENTMCNC: 33 G/DL (ref 31–36)
MCV RBC AUTO: 91 FL (ref 82–100)
MONOCYTES NFR BLD AUTO: 0.5 K/UL (ref 0.1–1.3)
MONOCYTES NFR BLD AUTO: 3.9 % (ref 2–12)
NEUTROPHILS # BLD AUTO: 10.3 K/UL (ref 1.8–8.9)
NEUTROPHILS NFR BLD AUTO: 87.4 % (ref 43–81)
PHOSPHATE SERPL-MCNC: 5.6 MG/DL (ref 2.5–4.9)
PLATELET # BLD AUTO: 265 K/UL (ref 150–450)
POTASSIUM SERPL-SCNC: 4.1 MMOL/L (ref 3.5–5.1)
PROT SERPL-MCNC: 7 G/DL (ref 6.4–8.2)
RBC # BLD AUTO: 4.35 MIL/UL (ref 4–5.2)
SODIUM SERPL-SCNC: 139 MMOL/L (ref 136–145)
WBC NRBC COR # BLD AUTO: 11.8 K/UL (ref 4.3–11)

## 2021-09-29 RX ADMIN — DEXTROSE MONOHYDRATE SCH MLS/HR: 50 INJECTION, SOLUTION INTRAVENOUS at 08:21

## 2021-09-29 RX ADMIN — RIVAROXABAN SCH MG: 15 TABLET, FILM COATED ORAL at 16:06

## 2021-09-29 RX ADMIN — ISONIAZID SCH MG: 300 TABLET ORAL at 08:20

## 2021-09-29 RX ADMIN — FUROSEMIDE SCH MG: 40 TABLET ORAL at 08:20

## 2021-09-29 RX ADMIN — LISINOPRIL SCH MG: 20 TABLET ORAL at 09:25

## 2021-09-29 RX ADMIN — FUROSEMIDE SCH MG: 40 TABLET ORAL at 16:05

## 2021-09-29 RX ADMIN — DEXTROSE MONOHYDRATE SCH MLS/HR: 50 INJECTION, SOLUTION INTRAVENOUS at 11:36

## 2021-09-29 RX ADMIN — SIMVASTATIN SCH MG: 10 TABLET, FILM COATED ORAL at 17:30

## 2021-09-29 RX ADMIN — DRONEDARONE SCH MG: 400 TABLET, FILM COATED ORAL at 16:05

## 2021-09-29 RX ADMIN — PIPERACILLIN SODIUM AND TAZOBACTAM SODIUM SCH MLS/HR: .25; 2 INJECTION, POWDER, LYOPHILIZED, FOR SOLUTION INTRAVENOUS at 12:38

## 2021-09-29 RX ADMIN — LORAZEPAM PRN MG: 1 TABLET ORAL at 22:20

## 2021-09-29 RX ADMIN — DRONEDARONE SCH MG: 400 TABLET, FILM COATED ORAL at 08:20

## 2021-09-29 RX ADMIN — FLUTICASONE FUROATE AND VILANTEROL TRIFENATATE SCH EACH: 100; 25 POWDER RESPIRATORY (INHALATION) at 10:57

## 2021-09-29 RX ADMIN — THYROID, PORCINE SCH MG: 30 TABLET ORAL at 08:21

## 2021-09-29 RX ADMIN — SOTALOL HYDROCHLORIDE SCH MG: 80 TABLET ORAL at 09:26

## 2021-09-29 RX ADMIN — PANTOPRAZOLE SODIUM SCH MG: 40 TABLET, DELAYED RELEASE ORAL at 08:20

## 2021-09-29 RX ADMIN — AMLODIPINE BESYLATE SCH MG: 5 TABLET ORAL at 09:25

## 2021-09-29 RX ADMIN — PIPERACILLIN SODIUM AND TAZOBACTAM SODIUM SCH MLS/HR: .25; 2 INJECTION, POWDER, LYOPHILIZED, FOR SOLUTION INTRAVENOUS at 20:44

## 2021-09-29 RX ADMIN — FUROSEMIDE SCH MG: 40 TABLET ORAL at 12:37

## 2021-09-29 NOTE — NUR
RN CLOSING NOTE



PT RESTING IN BED, EASILY AROUSABLE TO STIMULI. DENIES ANY PAIN OR DISCOMFORT AT THIS TIME. 
NO SOB. ASSISTED TO BR 2X THIS SHIFT, ABLE TO AMBULATE WITH STANDBY ASSIST ONLY. PT SLEPT 
WELL DURING THE NIGHT. TELE MONITOR READING SR 64. NO ACUTE EVENTS THIS SHIFT. SAFETY 
MEASURES MAINTAINED, BED IN LOWEST LOCKED POSITION, S/R UPX2, CALL LIGHT WITHIN REACH.

## 2021-09-29 NOTE — NUR
RN NOTES



PATIENT SEEN WATCHING TV. IV ATB INFUSING WELL. NO ADVERSE REACTION NOTED. OFFERED FLUIDS AS 
TOLERATED.

## 2021-09-29 NOTE — NUR
RN OPENING NOTE



RECEIVED PT AWAKE IN BED, WATCHING TV. A/OX3. DENIES ANY PAIN/DISCOMFORT AT THIS TIME. 
RESPIRATIONS EVEN AND UNLABORED. ON O2 @2LPM VIA NC. IV SITE L-AC #20 INTACT/PATENT/FLUSHES 
WELL. FAMILY (BROOKE) AT BEDSIDE. QUESTIONS AND CONCERNS ADDRESSED. PT IN NO ACUTE DISTRESS. 
SAFETY MEASURES IN PLACE, BED IN LOWEST LOCKED POSITION, S/R UPX2, CALL LIGHT WITHIN REACH. 
WILL CONT TO MONITOR.

## 2021-09-29 NOTE — NUR
RN NOTES



SEEN IN BED RESTING, AWAKE AND VERBALLY RESPONSIVE. CONTINUES ON O2 AT 2LPM VIA NC, NO 
RESPIRATORY DISTRESS. IV LINE ON LAC #20 INTACT AND PATENT; IV ATB ADMINISTERED AS 
INDICATED. ASSISTED TO THE BATHROOM AS NEEDED. SAFETY MEASURES MAINTAINED. WILL ENDORSE TO 
NIGHT SHIFT RN FOR WES.

## 2021-09-30 VITALS — DIASTOLIC BLOOD PRESSURE: 71 MMHG | SYSTOLIC BLOOD PRESSURE: 130 MMHG

## 2021-09-30 VITALS — SYSTOLIC BLOOD PRESSURE: 116 MMHG | DIASTOLIC BLOOD PRESSURE: 59 MMHG

## 2021-09-30 VITALS — DIASTOLIC BLOOD PRESSURE: 48 MMHG | SYSTOLIC BLOOD PRESSURE: 105 MMHG

## 2021-09-30 VITALS — SYSTOLIC BLOOD PRESSURE: 115 MMHG | DIASTOLIC BLOOD PRESSURE: 50 MMHG

## 2021-09-30 VITALS — DIASTOLIC BLOOD PRESSURE: 53 MMHG | SYSTOLIC BLOOD PRESSURE: 96 MMHG

## 2021-09-30 VITALS — SYSTOLIC BLOOD PRESSURE: 130 MMHG | DIASTOLIC BLOOD PRESSURE: 71 MMHG

## 2021-09-30 VITALS — SYSTOLIC BLOOD PRESSURE: 124 MMHG | DIASTOLIC BLOOD PRESSURE: 68 MMHG

## 2021-09-30 VITALS — SYSTOLIC BLOOD PRESSURE: 105 MMHG | DIASTOLIC BLOOD PRESSURE: 48 MMHG

## 2021-09-30 VITALS — DIASTOLIC BLOOD PRESSURE: 55 MMHG | SYSTOLIC BLOOD PRESSURE: 101 MMHG

## 2021-09-30 LAB
ALBUMIN SERPL BCP-MCNC: 2.7 G/DL (ref 3.4–5)
ALP SERPL-CCNC: 86 U/L (ref 46–116)
ALT SERPL W P-5'-P-CCNC: 25 U/L (ref 12–78)
AST SERPL W P-5'-P-CCNC: 33 U/L (ref 15–37)
BILIRUB SERPL-MCNC: 0.4 MG/DL (ref 0.2–1)
BILIRUB UR QL STRIP: NEGATIVE
BUN SERPL-MCNC: 38 MG/DL (ref 7–18)
CALCIUM SERPL-MCNC: 8 MG/DL (ref 8.5–10.1)
CHLORIDE SERPL-SCNC: 103 MMOL/L (ref 98–107)
CO2 SERPL-SCNC: 27 MMOL/L (ref 21–32)
COLOR UR: YELLOW
CREAT SERPL-MCNC: 1.7 MG/DL (ref 0.6–1.3)
GLUCOSE SERPL-MCNC: 92 MG/DL (ref 74–106)
GLUCOSE UR STRIP-MCNC: NEGATIVE MG/DL
LEUKOCYTE ESTERASE UR QL STRIP: NEGATIVE
NITRITE UR QL STRIP: NEGATIVE
PH UR STRIP: 6 [PH] (ref 5–8)
POTASSIUM SERPL-SCNC: 3.3 MMOL/L (ref 3.5–5.1)
PROT SERPL-MCNC: 6.5 G/DL (ref 6.4–8.2)
PROT UR QL STRIP: NEGATIVE MG/DL
SODIUM SERPL-SCNC: 142 MMOL/L (ref 136–145)
UROBILINOGEN UR STRIP-MCNC: 0.2 EU/DL

## 2021-09-30 RX ADMIN — FLUTICASONE FUROATE AND VILANTEROL TRIFENATATE SCH EACH: 100; 25 POWDER RESPIRATORY (INHALATION) at 08:34

## 2021-09-30 RX ADMIN — SOTALOL HYDROCHLORIDE SCH MG: 80 TABLET ORAL at 08:33

## 2021-09-30 RX ADMIN — DEXTROSE MONOHYDRATE SCH MLS/HR: 50 INJECTION, SOLUTION INTRAVENOUS at 10:01

## 2021-09-30 RX ADMIN — PIPERACILLIN SODIUM AND TAZOBACTAM SODIUM SCH MLS/HR: .25; 2 INJECTION, POWDER, LYOPHILIZED, FOR SOLUTION INTRAVENOUS at 12:08

## 2021-09-30 RX ADMIN — PANTOPRAZOLE SODIUM SCH MG: 40 TABLET, DELAYED RELEASE ORAL at 07:40

## 2021-09-30 RX ADMIN — DRONEDARONE SCH MG: 400 TABLET, FILM COATED ORAL at 16:16

## 2021-09-30 RX ADMIN — DRONEDARONE SCH MG: 400 TABLET, FILM COATED ORAL at 08:32

## 2021-09-30 RX ADMIN — SIMVASTATIN SCH MG: 10 TABLET, FILM COATED ORAL at 17:02

## 2021-09-30 RX ADMIN — PIPERACILLIN SODIUM AND TAZOBACTAM SODIUM SCH MLS/HR: .25; 2 INJECTION, POWDER, LYOPHILIZED, FOR SOLUTION INTRAVENOUS at 20:57

## 2021-09-30 RX ADMIN — PIPERACILLIN SODIUM AND TAZOBACTAM SODIUM SCH MLS/HR: .25; 2 INJECTION, POWDER, LYOPHILIZED, FOR SOLUTION INTRAVENOUS at 05:11

## 2021-09-30 RX ADMIN — ISONIAZID SCH MG: 300 TABLET ORAL at 08:32

## 2021-09-30 RX ADMIN — AMLODIPINE BESYLATE SCH MG: 5 TABLET ORAL at 08:32

## 2021-09-30 RX ADMIN — THYROID, PORCINE SCH MG: 30 TABLET ORAL at 08:32

## 2021-09-30 RX ADMIN — LISINOPRIL SCH MG: 20 TABLET ORAL at 08:33

## 2021-09-30 RX ADMIN — RIVAROXABAN SCH MG: 15 TABLET, FILM COATED ORAL at 16:16

## 2021-09-30 NOTE — NUR
RN OPENING NOTE



RECEIVED PT AWAKE IN BED, A/OX3, Citizen of [place] SPEAKING. DENIES ANY PAIN/DISCOMFORT AT THIS TIME. 
RESPIRATIONS EVEN AND UNLABORED. ON O2 @2LPM VIA NC. IV SITE L-AC #20 INTACT, PATENT AND 
FLUSHES WELL. PT IN NO ACUTE DISTRESS. SAFETY MEASURES IN PLACE, BED IN LOWEST LOCKED 
POSITION, S/R UPX2, CALL LIGHT WITHIN REACH. WILL CONT TO MONITOR ACCORDINGLY.

-------------------------------------------------------------------------------

Addendum: 09/30/21 at 0750 by GERARD JENSEN RN

-------------------------------------------------------------------------------

ON EXTERNAL MONITOR SHOWING SR HR AT 60'S.

## 2021-09-30 NOTE — NUR
TELE RN CLOSING NOTES



PATIENT AWAKE IN BED, A/OX 2-3, Sudanese SPEAKING. DENIES ANY PAIN/DISCOMFORT AT THIS TIME. 
RESPIRATIONS EVEN AND UNLABORED. ON O2 @2LPM VIA NC SATURATING WELL AT 98%, NO SOB NOTED. IV 
SITE L-AC #20 INTACT, PATENT AND FLUSHES WELL. PT IN NO ACUTE DISTRESS. ON EXTERNAL MONITOR 
SHOWING SR HR AT 64. SAFETY MEASURES IN PLACE, BED IN LOWEST LOCKED POSITION, S/R UPX2, CALL 
LIGHT WITHIN REACH. ALL NEEDS ATTENDED AND MET, DUE MEDS GIVEN AS ORDERED. WILL ENDORSE TO 
ONCOMING SHIFT FOR WES.

## 2021-09-30 NOTE — NUR
RN CLOSING NOTE



PT RESTING IN BED ON HER RIGHT SIDE, EASILY AROUSABLE TO STIMULI. DENIES ANY PAIN AT THIS 
TIME. NO SOB. ON O2 @2LPM VIA NC. TELE MONITOR CURRENTLY READS SB 54. IV SITE ON L-AC#20 
INTACT/PATENT/FLUSHES WELL. PT SLEPT INTERMITTENTLY DURING THE NIGHT. AMBULATES TO BR PRN 
WITH STANDBY ASSIST PROVIDED. NO ACUTE EVENTS DURING THE NIGHT. SAFETY MEASURES MAINTAINED.

## 2021-09-30 NOTE — NUR
TELE RN NOTE



PATIENT IN BED. A/OX2. NO S/S OF APPARENT DISTRESS. DENIES PAIN. 2 FAMILY MEMBER AT BEDSIDE 
AT THIS TIME. TELE MONITOR READING SINUS RHYTHM 61. LAC #20g PATENT AND INTACT, FLUSHED WITH 
SALINE -- NO LIQUIDS RUNNING AT THIS TIME. SAFETY IN PLACE. WILL CONTINUE TO MONITOR.

## 2021-10-01 VITALS — DIASTOLIC BLOOD PRESSURE: 67 MMHG | SYSTOLIC BLOOD PRESSURE: 112 MMHG

## 2021-10-01 VITALS — SYSTOLIC BLOOD PRESSURE: 107 MMHG | DIASTOLIC BLOOD PRESSURE: 52 MMHG

## 2021-10-01 VITALS — DIASTOLIC BLOOD PRESSURE: 60 MMHG | SYSTOLIC BLOOD PRESSURE: 117 MMHG

## 2021-10-01 VITALS — SYSTOLIC BLOOD PRESSURE: 116 MMHG | DIASTOLIC BLOOD PRESSURE: 74 MMHG

## 2021-10-01 VITALS — SYSTOLIC BLOOD PRESSURE: 122 MMHG | DIASTOLIC BLOOD PRESSURE: 64 MMHG

## 2021-10-01 VITALS — SYSTOLIC BLOOD PRESSURE: 96 MMHG | DIASTOLIC BLOOD PRESSURE: 53 MMHG

## 2021-10-01 LAB
ALBUMIN SERPL BCP-MCNC: 2.6 G/DL (ref 3.4–5)
ALP SERPL-CCNC: 86 U/L (ref 46–116)
ALT SERPL W P-5'-P-CCNC: 24 U/L (ref 12–78)
AST SERPL W P-5'-P-CCNC: 28 U/L (ref 15–37)
BILIRUB SERPL-MCNC: 0.4 MG/DL (ref 0.2–1)
BUN SERPL-MCNC: 44 MG/DL (ref 7–18)
CALCIUM SERPL-MCNC: 8 MG/DL (ref 8.5–10.1)
CHLORIDE SERPL-SCNC: 105 MMOL/L (ref 98–107)
CO2 SERPL-SCNC: 31 MMOL/L (ref 21–32)
CREAT SERPL-MCNC: 1.8 MG/DL (ref 0.6–1.3)
GLUCOSE SERPL-MCNC: 90 MG/DL (ref 74–106)
MAGNESIUM SERPL-MCNC: 2 MG/DL (ref 1.8–2.4)
PHOSPHATE SERPL-MCNC: 5.5 MG/DL (ref 2.5–4.9)
POTASSIUM SERPL-SCNC: 3.8 MMOL/L (ref 3.5–5.1)
PROT SERPL-MCNC: 6.3 G/DL (ref 6.4–8.2)
SODIUM SERPL-SCNC: 142 MMOL/L (ref 136–145)

## 2021-10-01 RX ADMIN — LORAZEPAM PRN MG: 1 TABLET ORAL at 20:34

## 2021-10-01 RX ADMIN — DEXTROSE MONOHYDRATE SCH MLS/HR: 50 INJECTION, SOLUTION INTRAVENOUS at 10:48

## 2021-10-01 RX ADMIN — SIMVASTATIN SCH MG: 10 TABLET, FILM COATED ORAL at 16:54

## 2021-10-01 RX ADMIN — THYROID, PORCINE SCH MG: 30 TABLET ORAL at 08:41

## 2021-10-01 RX ADMIN — RIVAROXABAN SCH MG: 15 TABLET, FILM COATED ORAL at 16:57

## 2021-10-01 RX ADMIN — PANTOPRAZOLE SODIUM SCH MG: 40 TABLET, DELAYED RELEASE ORAL at 08:41

## 2021-10-01 RX ADMIN — PIPERACILLIN SODIUM AND TAZOBACTAM SODIUM SCH MLS/HR: .25; 2 INJECTION, POWDER, LYOPHILIZED, FOR SOLUTION INTRAVENOUS at 13:34

## 2021-10-01 RX ADMIN — DRONEDARONE SCH MG: 400 TABLET, FILM COATED ORAL at 08:41

## 2021-10-01 RX ADMIN — FLUTICASONE FUROATE AND VILANTEROL TRIFENATATE SCH EACH: 100; 25 POWDER RESPIRATORY (INHALATION) at 08:41

## 2021-10-01 RX ADMIN — AMLODIPINE BESYLATE SCH MG: 5 TABLET ORAL at 08:32

## 2021-10-01 RX ADMIN — SOTALOL HYDROCHLORIDE SCH MG: 80 TABLET ORAL at 08:31

## 2021-10-01 RX ADMIN — ISONIAZID SCH MG: 300 TABLET ORAL at 08:41

## 2021-10-01 RX ADMIN — PIPERACILLIN SODIUM AND TAZOBACTAM SODIUM SCH MLS/HR: .25; 2 INJECTION, POWDER, LYOPHILIZED, FOR SOLUTION INTRAVENOUS at 20:34

## 2021-10-01 RX ADMIN — DRONEDARONE SCH MG: 400 TABLET, FILM COATED ORAL at 16:54

## 2021-10-01 RX ADMIN — PIPERACILLIN SODIUM AND TAZOBACTAM SODIUM SCH MLS/HR: .25; 2 INJECTION, POWDER, LYOPHILIZED, FOR SOLUTION INTRAVENOUS at 04:43

## 2021-10-01 NOTE — NUR
TELE RN NOTE 



PATIENT FEELING ANXIOUS, REQUESTED ATIVAN, MEDICATION GIVEN AS ORDERED. WILL CONTINUE TO 
MONITOR PATIENT

## 2021-10-01 NOTE — NUR
TELE RN CLOSING NOTE



PATIENT IN BED WITH EYES CLOSED, EASY TO AROUSE. PATIENT A/OX3. NO S/S OF APPARENT DISTRESS 
TOLERATING 2LPM OF OXYGEN VIA NC. DENIES PAIN. TELE MONITOR READING SINUS TAMRA IN THE 50'S. 
NO FLUIDS RUNNING AT THIS TIME. ALL NEEDS ATTENDED. SAFETY KEPT IN PLACE THE WHOLE SHIFT. NO 
SIGNIFICANT CHANGE SINCE LAST SHIFT. WILL ENDORSE CARE TO MORNING SHIFT RN.

## 2021-10-01 NOTE — NUR
tele lvn: initial assessment



received pt in bed awake, a/ox3. no c/o sob, chest pain, or any discomfort. instructed to 
call for assistance. will continue to monitor.

## 2021-10-01 NOTE — NUR
TELE RN OPENING NOTES



PATIENT AWAKE IN BED WITH FAMILY AT BEDSIDE, ALERT/ORIENTED X 3, PT SPEAKS Macedonian ONLY, 
ABLE TO MAKE NEEDS KNOWN. PT DENIES PAIN AT THIS TIME. PATIENT ON EXTERNAL CARDIAC MONITOR 
READING SR, HR: 66. LEFT AC #20G IV ACCESS INTACT AND FLUSHING WELL, SALINE LOCKED. PATIENT 
HAS BRP WITH STANDBY ASSIST. SAFETY MEASURES IN PLACE: CALL LIGHT WITHIN REACH, BED LOCKED 
IN LOW POSITION, SIDE RAILS UP X 2, BED ALARM ON. WILL CONTINUE TO MONITOR PATIENT 
THROUGHOUT SHIFT

## 2021-10-02 VITALS — SYSTOLIC BLOOD PRESSURE: 137 MMHG | DIASTOLIC BLOOD PRESSURE: 56 MMHG

## 2021-10-02 VITALS — DIASTOLIC BLOOD PRESSURE: 72 MMHG | SYSTOLIC BLOOD PRESSURE: 134 MMHG

## 2021-10-02 VITALS — SYSTOLIC BLOOD PRESSURE: 134 MMHG | DIASTOLIC BLOOD PRESSURE: 72 MMHG

## 2021-10-02 VITALS — SYSTOLIC BLOOD PRESSURE: 111 MMHG | DIASTOLIC BLOOD PRESSURE: 52 MMHG

## 2021-10-02 LAB
ALBUMIN SERPL BCP-MCNC: 2.5 G/DL (ref 3.4–5)
ALP SERPL-CCNC: 91 U/L (ref 46–116)
ALT SERPL W P-5'-P-CCNC: 17 U/L (ref 12–78)
AST SERPL W P-5'-P-CCNC: 25 U/L (ref 15–37)
BILIRUB SERPL-MCNC: 0.3 MG/DL (ref 0.2–1)
BUN SERPL-MCNC: 35 MG/DL (ref 7–18)
CALCIUM SERPL-MCNC: 8.3 MG/DL (ref 8.5–10.1)
CHLORIDE SERPL-SCNC: 107 MMOL/L (ref 98–107)
CO2 SERPL-SCNC: 29 MMOL/L (ref 21–32)
CREAT SERPL-MCNC: 1.3 MG/DL (ref 0.6–1.3)
GLUCOSE SERPL-MCNC: 82 MG/DL (ref 74–106)
POTASSIUM SERPL-SCNC: 4 MMOL/L (ref 3.5–5.1)
PROT SERPL-MCNC: 6 G/DL (ref 6.4–8.2)
SODIUM SERPL-SCNC: 141 MMOL/L (ref 136–145)

## 2021-10-02 RX ADMIN — THYROID, PORCINE SCH MG: 30 TABLET ORAL at 08:31

## 2021-10-02 RX ADMIN — FLUTICASONE FUROATE AND VILANTEROL TRIFENATATE SCH EACH: 100; 25 POWDER RESPIRATORY (INHALATION) at 08:33

## 2021-10-02 RX ADMIN — DEXTROSE MONOHYDRATE SCH MLS/HR: 50 INJECTION, SOLUTION INTRAVENOUS at 10:34

## 2021-10-02 RX ADMIN — PANTOPRAZOLE SODIUM SCH MG: 40 TABLET, DELAYED RELEASE ORAL at 08:30

## 2021-10-02 RX ADMIN — DRONEDARONE SCH MG: 400 TABLET, FILM COATED ORAL at 08:31

## 2021-10-02 RX ADMIN — PIPERACILLIN SODIUM AND TAZOBACTAM SODIUM SCH MLS/HR: .25; 2 INJECTION, POWDER, LYOPHILIZED, FOR SOLUTION INTRAVENOUS at 13:00

## 2021-10-02 RX ADMIN — SOTALOL HYDROCHLORIDE SCH MG: 80 TABLET ORAL at 08:31

## 2021-10-02 RX ADMIN — ISONIAZID SCH MG: 300 TABLET ORAL at 08:30

## 2021-10-02 RX ADMIN — PIPERACILLIN SODIUM AND TAZOBACTAM SODIUM SCH MLS/HR: .25; 2 INJECTION, POWDER, LYOPHILIZED, FOR SOLUTION INTRAVENOUS at 05:23

## 2021-10-02 RX ADMIN — AMLODIPINE BESYLATE SCH MG: 5 TABLET ORAL at 08:31

## 2021-10-02 NOTE — NUR
TELE RN CLOSING NOTES 



PATIENT SLEEPING IN BED, NO SIGNIFICANT CHANGES THROUGHOUT SHIFT. PT STABLE ON 2 L VIA NC, 
NO S/S OF DISTRESS OR SOB NOTED, BREATHING EVEN AND UNLABORED. PATIENT ON EXTERNAL CARDIAC 
MONITOR READING SB, HR: 55. LEFT AC #20G IV ACCESS INTACT AND SALINE LOCKED. PATIENT HAS BRP 
WITH STANDBY ASSIST. MEDICATIONS GIVEN AS ORDERED, PT NEEDS MET THROUGHOUT SHIFT. SAFETY 
MEASURES IN PLACE: CALL LIGHT WITHIN REACH, BED LOCKED IN LOW POSITION, SIDE RAILS UP X 2, 
BED ALARM ON. ENDORSED TO DAY SHIFT NURSE FOR CONTINUITY OF CARE

## 2021-10-02 NOTE — NUR
TELE RN NOTE



PATIENT WAS SUPPOSED TO BE PICKED UP BY FAMILY @ 12 FOR DC. IV REMOVED. PATIENT IS STILL 
HERE BUT UNABLE TO TO ADMINISTER 1300 ANTIBIOTIC DUE TO NO IV SITE. PATIENT DOES NOT WANT 
ANOTHER IV STARTED - STATES SHE WILL BE LEAVING SOON.

## 2021-10-02 NOTE — NUR
TELE RN OPENING NOTES



RECEIVED PATIENT LYING IN BED, RESTING. EASY TO AROUSE. A/O X3, Gambian SPEAKING ONLY. ON 
EXTERNAL CARDIAC MONITOR - READING SR  67. IV ACCESS TO LEFT AC #20 - SALINE LOCKED. SAFETY 
MEASURES IN PLACE, CALL LIGHT WITHIN REACH, WILL CONTINUE TO MONITOR.

## 2021-10-02 NOTE — NUR
TELE RN DISCHARGE NOTE



PATIENT DISCHARGED VIA PRIVATE CAR @ 1310. STABLE, A/O X4. STABLE ON ROOM AIR - NO SOB 
NOTED. NO DISTRESS OR DISCOMFORT AT THIS TIME. ALL EXITCARE AND PATIENT EDUCATION REVIEWED 
WITH PATIENT WITH THE HELP OF REAL MAGANA FOR TRANSLATION. PATIENT VERBALIZED UNDERSTANDING. 
IV ACCESS REMOVED. WRISTBAND REMOVED. PATIENT TAKEN DOWN TO LOBBY IN WHEELCHAIR, ACCOMPANIED 
BY MK NOBLE. CHARGE NURSE AND MD AWARE OF DC.

## 2021-12-24 ENCOUNTER — HOSPITAL ENCOUNTER (INPATIENT)
Dept: HOSPITAL 54 - ER | Age: 86
LOS: 1 days | Discharge: HOME | DRG: 194 | End: 2021-12-25
Attending: NURSE PRACTITIONER | Admitting: NURSE PRACTITIONER
Payer: MEDICAID

## 2021-12-24 VITALS — DIASTOLIC BLOOD PRESSURE: 70 MMHG | SYSTOLIC BLOOD PRESSURE: 168 MMHG

## 2021-12-24 VITALS — SYSTOLIC BLOOD PRESSURE: 159 MMHG | DIASTOLIC BLOOD PRESSURE: 90 MMHG

## 2021-12-24 VITALS — HEIGHT: 66 IN | BODY MASS INDEX: 22.18 KG/M2 | WEIGHT: 138 LBS

## 2021-12-24 DIAGNOSIS — D68.59: ICD-10-CM

## 2021-12-24 DIAGNOSIS — E78.5: ICD-10-CM

## 2021-12-24 DIAGNOSIS — I48.0: ICD-10-CM

## 2021-12-24 DIAGNOSIS — E03.9: ICD-10-CM

## 2021-12-24 DIAGNOSIS — I27.20: ICD-10-CM

## 2021-12-24 DIAGNOSIS — Z79.899: ICD-10-CM

## 2021-12-24 DIAGNOSIS — I11.0: Primary | ICD-10-CM

## 2021-12-24 DIAGNOSIS — Z79.01: ICD-10-CM

## 2021-12-24 DIAGNOSIS — Z20.822: ICD-10-CM

## 2021-12-24 DIAGNOSIS — I50.33: ICD-10-CM

## 2021-12-24 LAB
ALBUMIN SERPL BCP-MCNC: 3.4 G/DL (ref 3.4–5)
ALP SERPL-CCNC: 124 U/L (ref 46–116)
ALT SERPL W P-5'-P-CCNC: 29 U/L (ref 12–78)
AST SERPL W P-5'-P-CCNC: 34 U/L (ref 15–37)
BASOPHILS # BLD AUTO: 0.1 K/UL (ref 0–0.2)
BASOPHILS NFR BLD AUTO: 0.6 % (ref 0–2)
BILIRUB DIRECT SERPL-MCNC: 0.2 MG/DL (ref 0–0.2)
BILIRUB SERPL-MCNC: 0.8 MG/DL (ref 0.2–1)
BUN SERPL-MCNC: 15 MG/DL (ref 7–18)
CALCIUM SERPL-MCNC: 9.1 MG/DL (ref 8.5–10.1)
CHLORIDE SERPL-SCNC: 100 MMOL/L (ref 98–107)
CO2 SERPL-SCNC: 29 MMOL/L (ref 21–32)
CREAT SERPL-MCNC: 0.9 MG/DL (ref 0.6–1.3)
EOSINOPHIL NFR BLD AUTO: 1.2 % (ref 0–6)
GLUCOSE SERPL-MCNC: 163 MG/DL (ref 74–106)
HCT VFR BLD AUTO: 44 % (ref 33–45)
HGB BLD-MCNC: 14.4 G/DL (ref 11.5–14.8)
LYMPHOCYTES NFR BLD AUTO: 1.4 K/UL (ref 0.8–4.8)
LYMPHOCYTES NFR BLD AUTO: 14.2 % (ref 20–44)
MCHC RBC AUTO-ENTMCNC: 33 G/DL (ref 31–36)
MCV RBC AUTO: 91 FL (ref 82–100)
MONOCYTES NFR BLD AUTO: 0.4 K/UL (ref 0.1–1.3)
MONOCYTES NFR BLD AUTO: 3.8 % (ref 2–12)
NEUTROPHILS # BLD AUTO: 7.7 K/UL (ref 1.8–8.9)
NEUTROPHILS NFR BLD AUTO: 80.2 % (ref 43–81)
PLATELET # BLD AUTO: 303 K/UL (ref 150–450)
POTASSIUM SERPL-SCNC: 3.8 MMOL/L (ref 3.5–5.1)
PROT SERPL-MCNC: 7.9 G/DL (ref 6.4–8.2)
RBC # BLD AUTO: 4.81 MIL/UL (ref 4–5.2)
SODIUM SERPL-SCNC: 139 MMOL/L (ref 136–145)
WBC NRBC COR # BLD AUTO: 9.6 K/UL (ref 4.3–11)

## 2021-12-24 PROCEDURE — G0378 HOSPITAL OBSERVATION PER HR: HCPCS

## 2021-12-24 PROCEDURE — U0003 INFECTIOUS AGENT DETECTION BY NUCLEIC ACID (DNA OR RNA); SEVERE ACUTE RESPIRATORY SYNDROME CORONAVIRUS 2 (SARS-COV-2) (CORONAVIRUS DISEASE [COVID-19]), AMPLIFIED PROBE TECHNIQUE, MAKING USE OF HIGH THROUGHPUT TECHNOLOGIES AS DESCRIBED BY CMS-2020-01-R: HCPCS

## 2021-12-24 NOTE — NUR
RN NOTE



SPOKE TO DR. RENA BRODY OVER HIS MOBILE PHONE REGARDING ADMISSION ORDERS. HE STATES" I'LL GET 
TO IT".

## 2021-12-24 NOTE — NUR
RN NOTE



PER CHRISTIANO POLLACK RN, DR. RENA BRODY WAS CALLED FOR ADMISSION ORDERS TWICE BUT NO ANSWER. 
WILL CALL AGAIN.

## 2021-12-24 NOTE — NUR
RN GINO



RECEIVED THIS 88Y/O HIOSPANIC FEMALE VIA TRANSPORTED VIA [facility] WITH JENY SALTER. PT IS AWAKE, 
ALERT, ORIENTED X3. AMBULATORY WITH ASSIST. NO BELONGING EXCEPT THE CLOTHES SHE'S WEARING. 
PT IS ALSO WEARING YELLOW EARRINGS AND LOWER/UPPER DENTURES.PT DENIES SOB OR CHEST PAIN. 
SAFETY MEASURES IN PLACE. CALL LIGHT WITHIN REACH. BED ALARM ACTIVATED FOR SAFETY. WILL 
ADMIT PT ACCORDINGLY.

-------------------------------------------------------------------------------

Addendum: 12/24/21 at 1545 by PRACHI LEE RN

-------------------------------------------------------------------------------

UTILIZED Titt[employer] FOR   [facility] WITH NUMBER [id number] DURING ADMISSION /INITIAL 
ASSESSMENT.

## 2021-12-24 NOTE — NUR
PATIENT BREATHING AT 40 RESPIRATIONS PER MINUTE ON 2 L/MIN NASAL CANNULA AT 98%. PATIENT 
STATED SHE FEELS UNWELL AND FEEL ANXIOUS. SPOKE TO DOCTOR WHO PRESCRIBED PO ATIVAN 1 MG 
ONCE.

-------------------------------------------------------------------------------

Addendum: 12/24/21 at 2135 by YARIEL EUCEDA RN

-------------------------------------------------------------------------------

WILL CONTINUE TO MONITOR

-------------------------------------------------------------------------------

Addendum: 12/24/21 at 2215 by YARIEL EUCEDA RN

-------------------------------------------------------------------------------

ALSO NOTIFIED DOCTOR OF HIGH BLOOD PRESSURE AND NO PRN BP MEDICATION BUT DID NOT ORDER 
ANYTHING. WILL CONTINUE TO MONITOR.

## 2021-12-24 NOTE — NUR
TO ER BED 7, [id number] HOME C/O SUDDEN ONSET SOB AND CHEST TIGHTNESS X 30MINS. 
CONNECTED TO MONITOR AND OXYGEN SATTING AT 97%

## 2021-12-24 NOTE — NUR
RN NOTE



PT IS AWAKE, ALERT. NO SOB. DENIES SOB OR CHEST PAIN AT THIS TIME. SR ON TELE MONITOR. BUMEX 
IV INFUSING AT 10 CC/HR TO LFA. ADMITTING ORDERS NOTED AND CARRIED OUT. SAFETY PRECAUTIONS 
ARE IN PLACE. CALL LIGHT WITHIN REACH. ALL NEEDS ATTENDED PROMPTLY. WES ENDORSED TO NOC RN

## 2021-12-24 NOTE — NUR
RN OPENING NOTES



RECEIVED PT IN BED, ASLEEP, AWAKENS TO VERBAL STIMULI. AOx3-4, ABLE TO MAKE NEEDS KNOWN AND 
Northern Irish SPEAKING. ON 2 L/MIN VIA NASAL CANNULA AND TOLERATING WELL. NO S/SX OF RESPIRATORY 
DISTRESS NOTED. TELE MONITOR DETECTS SINUS RHYTHM WITH RATE OF 68. IV ACCESS IN LAC #18 AND 
LFA #20 RUNNING BUMEX @ 10 ML/HR. IV ACCESS IS INTACT, PATENT, AND FLUSHING WELL.  SAFETY 
PRECAUTIONS IN PLACE: BED IN LOWEST, LOCKED POSITION, SIDERAILS UPx2, AND BRAKES ON. TABLE 
AND CALL LIGHT WITHIN REACH. WILL CONTINUE TO MONITOR.

## 2021-12-25 VITALS — SYSTOLIC BLOOD PRESSURE: 152 MMHG | DIASTOLIC BLOOD PRESSURE: 83 MMHG

## 2021-12-25 VITALS — DIASTOLIC BLOOD PRESSURE: 43 MMHG | SYSTOLIC BLOOD PRESSURE: 92 MMHG

## 2021-12-25 VITALS — SYSTOLIC BLOOD PRESSURE: 127 MMHG | DIASTOLIC BLOOD PRESSURE: 76 MMHG

## 2021-12-25 VITALS — SYSTOLIC BLOOD PRESSURE: 112 MMHG | DIASTOLIC BLOOD PRESSURE: 46 MMHG

## 2021-12-25 LAB
BASOPHILS # BLD AUTO: 0 K/UL (ref 0–0.2)
BASOPHILS NFR BLD AUTO: 0.7 % (ref 0–2)
BUN SERPL-MCNC: 17 MG/DL (ref 7–18)
CALCIUM SERPL-MCNC: 8.9 MG/DL (ref 8.5–10.1)
CHLORIDE SERPL-SCNC: 100 MMOL/L (ref 98–107)
CHOLEST SERPL-MCNC: 178 MG/DL (ref ?–200)
CO2 SERPL-SCNC: 25 MMOL/L (ref 21–32)
CREAT SERPL-MCNC: 1.2 MG/DL (ref 0.6–1.3)
EOSINOPHIL NFR BLD AUTO: 0.6 % (ref 0–6)
GLUCOSE SERPL-MCNC: 87 MG/DL (ref 74–106)
HCT VFR BLD AUTO: 37 % (ref 33–45)
HDLC SERPL-MCNC: 51 MG/DL (ref 40–60)
HGB BLD-MCNC: 12.2 G/DL (ref 11.5–14.8)
LDLC SERPL DIRECT ASSAY-MCNC: 108 MG/DL (ref 0–99)
LYMPHOCYTES NFR BLD AUTO: 1.3 K/UL (ref 0.8–4.8)
LYMPHOCYTES NFR BLD AUTO: 18.8 % (ref 20–44)
MAGNESIUM SERPL-MCNC: 1.8 MG/DL (ref 1.8–2.4)
MCHC RBC AUTO-ENTMCNC: 33 G/DL (ref 31–36)
MCV RBC AUTO: 91 FL (ref 82–100)
MONOCYTES NFR BLD AUTO: 0.4 K/UL (ref 0.1–1.3)
MONOCYTES NFR BLD AUTO: 6.2 % (ref 2–12)
NEUTROPHILS # BLD AUTO: 5 K/UL (ref 1.8–8.9)
NEUTROPHILS NFR BLD AUTO: 73.7 % (ref 43–81)
PHOSPHATE SERPL-MCNC: 4.3 MG/DL (ref 2.5–4.9)
PLATELET # BLD AUTO: 258 K/UL (ref 150–450)
POTASSIUM SERPL-SCNC: 3.4 MMOL/L (ref 3.5–5.1)
RBC # BLD AUTO: 4.09 MIL/UL (ref 4–5.2)
SODIUM SERPL-SCNC: 140 MMOL/L (ref 136–145)
TRIGL SERPL-MCNC: 100 MG/DL (ref 30–150)
TSH SERPL DL<=0.005 MIU/L-ACNC: 3.72 UIU/ML (ref 0.36–3.74)
WBC NRBC COR # BLD AUTO: 6.7 K/UL (ref 4.3–11)

## 2021-12-25 NOTE — NUR
RN CLOSING NOTES



PT IN BED, ASLEEP, AWAKENS TO VERBAL STIMULI. AOx3-4, ABLE TO MAKE NEEDS KNOWN AND Indonesian 
SPEAKING. ON 2 L/MIN VIA NASAL CANNULA AND TOLERATING WELL. NO SOB NOTED. NO S/SX OF 
RESPIRATORY DISTRESS NOTED. TELE MONITOR DETECTS SINUS RHYTHM WITH RATE OF 65-75. IV ACCESS 
IN LAC #18 AND LFA #20. IV ACCESS IS INTACT, PATENT, AND FLUSHING WELL.  ALL NEEDS MET. PT 
KEPT CLEAN AND DRY. SAFETY PRECAUTIONS IN PLACE: BED IN LOWEST, LOCKED POSITION, SIDERAILS 
UPx2, AND BRAKES ON. TABLE AND CALL LIGHT WITHIN REACH. WILL ENDORSE TO ONCOMING SHIFT FOR 
WES.

## 2021-12-25 NOTE — NUR
RN NOTE



RECEIVED PT RESTING IN BED, VERBALLY RESPONSIVE. CONTINUES ON O2 VIA NC @2L, NO RESPIRATORY 
DISTRESS NOTED AT THIS TIME. IV ACCESS IN LAC G18 AND LFA G20 IN PLACE AND PATENT. ALL 
SAFETY MEASURES FOLLOWED. WILL CONTINUE TO MONITOR.

## 2021-12-25 NOTE — NUR
RN NOTE



PT D/C TO HOME, IN STABLE CONDITION. NOT IN RESPIRATORY DISTRESS. V/S WNL. NO COMPLAINTS OF 
PAIN AND DISCOMFORT. D/C INSTRUCTIONS GIVEN TO GRANDSON AND VERBALIZED UNDERSTANDING.

## 2024-01-10 NOTE — NUR
PT TRANSFERRED PER ACLS PROTOCOL Detail Level: Zone Render In Strict Bullet Format?: No Initiate Treatment: Ketoconazole 2% topical cream BID\\nHTC 2.5% topical cream BID x 2-3 days PRN with flares Initiate Treatment: Valtrex 1g - Take 2 g PO at symptom onset, then 2 g PO 12 hours later